# Patient Record
Sex: FEMALE | Race: WHITE | Employment: UNEMPLOYED | URBAN - METROPOLITAN AREA
[De-identification: names, ages, dates, MRNs, and addresses within clinical notes are randomized per-mention and may not be internally consistent; named-entity substitution may affect disease eponyms.]

---

## 2017-07-14 ENCOUNTER — ANESTHESIA (OUTPATIENT)
Dept: SURGERY | Facility: CLINIC | Age: 27
End: 2017-07-14
Payer: COMMERCIAL

## 2017-07-14 ENCOUNTER — SURGERY (OUTPATIENT)
Age: 27
End: 2017-07-14

## 2017-07-14 ENCOUNTER — HOSPITAL ENCOUNTER (OUTPATIENT)
Facility: CLINIC | Age: 27
Discharge: HOME | End: 2017-07-14
Attending: OBSTETRICS & GYNECOLOGY | Admitting: OBSTETRICS & GYNECOLOGY
Payer: COMMERCIAL

## 2017-07-14 ENCOUNTER — ANESTHESIA EVENT (OUTPATIENT)
Dept: SURGERY | Facility: CLINIC | Age: 27
End: 2017-07-14
Payer: COMMERCIAL

## 2017-07-14 VITALS
TEMPERATURE: 99 F | DIASTOLIC BLOOD PRESSURE: 63 MMHG | HEIGHT: 67 IN | RESPIRATION RATE: 16 BRPM | BODY MASS INDEX: 24.94 KG/M2 | SYSTOLIC BLOOD PRESSURE: 107 MMHG | WEIGHT: 158.9 LBS | HEART RATE: 87 BPM | OXYGEN SATURATION: 97 %

## 2017-07-14 DIAGNOSIS — G89.18 ACUTE POST-OPERATIVE PAIN: Primary | ICD-10-CM

## 2017-07-14 PROCEDURE — 27210794 ZZH OR GENERAL SUPPLY STERILE: Performed by: OBSTETRICS & GYNECOLOGY

## 2017-07-14 PROCEDURE — 37000008 ZZH ANESTHESIA TECHNICAL FEE, 1ST 30 MIN: Performed by: OBSTETRICS & GYNECOLOGY

## 2017-07-14 PROCEDURE — 25000125 ZZHC RX 250: Performed by: NURSE ANESTHETIST, CERTIFIED REGISTERED

## 2017-07-14 PROCEDURE — 25000128 H RX IP 250 OP 636: Performed by: OBSTETRICS & GYNECOLOGY

## 2017-07-14 PROCEDURE — 88305 TISSUE EXAM BY PATHOLOGIST: CPT | Mod: 26 | Performed by: OBSTETRICS & GYNECOLOGY

## 2017-07-14 PROCEDURE — 25000128 H RX IP 250 OP 636: Performed by: NURSE ANESTHETIST, CERTIFIED REGISTERED

## 2017-07-14 PROCEDURE — 00000159 ZZHCL STATISTIC H-SEND OUTS PREP: Performed by: OBSTETRICS & GYNECOLOGY

## 2017-07-14 PROCEDURE — 71000027 ZZH RECOVERY PHASE 2 EACH 15 MINS: Performed by: OBSTETRICS & GYNECOLOGY

## 2017-07-14 PROCEDURE — 37000009 ZZH ANESTHESIA TECHNICAL FEE, EACH ADDTL 15 MIN: Performed by: OBSTETRICS & GYNECOLOGY

## 2017-07-14 PROCEDURE — 25000125 ZZHC RX 250: Performed by: OBSTETRICS & GYNECOLOGY

## 2017-07-14 PROCEDURE — 36000050 ZZH SURGERY LEVEL 2 1ST 30 MIN: Performed by: OBSTETRICS & GYNECOLOGY

## 2017-07-14 PROCEDURE — 88305 TISSUE EXAM BY PATHOLOGIST: CPT | Performed by: OBSTETRICS & GYNECOLOGY

## 2017-07-14 PROCEDURE — 40000170 ZZH STATISTIC PRE-PROCEDURE ASSESSMENT II: Performed by: OBSTETRICS & GYNECOLOGY

## 2017-07-14 PROCEDURE — 25000128 H RX IP 250 OP 636: Performed by: ANESTHESIOLOGY

## 2017-07-14 PROCEDURE — 71000012 ZZH RECOVERY PHASE 1 LEVEL 1 FIRST HR: Performed by: OBSTETRICS & GYNECOLOGY

## 2017-07-14 RX ORDER — HYDROMORPHONE HYDROCHLORIDE 1 MG/ML
.3-.5 INJECTION, SOLUTION INTRAMUSCULAR; INTRAVENOUS; SUBCUTANEOUS EVERY 10 MIN PRN
Status: DISCONTINUED | OUTPATIENT
Start: 2017-07-14 | End: 2017-07-14 | Stop reason: HOSPADM

## 2017-07-14 RX ORDER — PROPOFOL 10 MG/ML
INJECTION, EMULSION INTRAVENOUS PRN
Status: DISCONTINUED | OUTPATIENT
Start: 2017-07-14 | End: 2017-07-14

## 2017-07-14 RX ORDER — SODIUM CHLORIDE, SODIUM LACTATE, POTASSIUM CHLORIDE, CALCIUM CHLORIDE 600; 310; 30; 20 MG/100ML; MG/100ML; MG/100ML; MG/100ML
INJECTION, SOLUTION INTRAVENOUS CONTINUOUS PRN
Status: DISCONTINUED | OUTPATIENT
Start: 2017-07-14 | End: 2017-07-14

## 2017-07-14 RX ORDER — FENTANYL CITRATE 50 UG/ML
25-50 INJECTION, SOLUTION INTRAMUSCULAR; INTRAVENOUS EVERY 5 MIN PRN
Status: DISCONTINUED | OUTPATIENT
Start: 2017-07-14 | End: 2017-07-14 | Stop reason: HOSPADM

## 2017-07-14 RX ORDER — ONDANSETRON 2 MG/ML
4 INJECTION INTRAMUSCULAR; INTRAVENOUS EVERY 30 MIN PRN
Status: DISCONTINUED | OUTPATIENT
Start: 2017-07-14 | End: 2017-07-14 | Stop reason: HOSPADM

## 2017-07-14 RX ORDER — HYDROCODONE BITARTRATE AND ACETAMINOPHEN 5; 325 MG/1; MG/1
1-2 TABLET ORAL
Status: DISCONTINUED | OUTPATIENT
Start: 2017-07-14 | End: 2017-07-14 | Stop reason: HOSPADM

## 2017-07-14 RX ORDER — ONDANSETRON 4 MG/1
4 TABLET, ORALLY DISINTEGRATING ORAL EVERY 30 MIN PRN
Status: DISCONTINUED | OUTPATIENT
Start: 2017-07-14 | End: 2017-07-14 | Stop reason: HOSPADM

## 2017-07-14 RX ORDER — LIDOCAINE HYDROCHLORIDE 20 MG/ML
INJECTION, SOLUTION INFILTRATION; PERINEURAL PRN
Status: DISCONTINUED | OUTPATIENT
Start: 2017-07-14 | End: 2017-07-14

## 2017-07-14 RX ORDER — ONDANSETRON 2 MG/ML
INJECTION INTRAMUSCULAR; INTRAVENOUS PRN
Status: DISCONTINUED | OUTPATIENT
Start: 2017-07-14 | End: 2017-07-14

## 2017-07-14 RX ORDER — FENTANYL CITRATE 50 UG/ML
50-100 INJECTION, SOLUTION INTRAMUSCULAR; INTRAVENOUS
Status: COMPLETED | OUTPATIENT
Start: 2017-07-14 | End: 2017-07-14

## 2017-07-14 RX ORDER — NALOXONE HYDROCHLORIDE 0.4 MG/ML
.1-.4 INJECTION, SOLUTION INTRAMUSCULAR; INTRAVENOUS; SUBCUTANEOUS
Status: DISCONTINUED | OUTPATIENT
Start: 2017-07-14 | End: 2017-07-14 | Stop reason: HOSPADM

## 2017-07-14 RX ORDER — FENTANYL CITRATE 50 UG/ML
25-50 INJECTION, SOLUTION INTRAMUSCULAR; INTRAVENOUS
Status: DISCONTINUED | OUTPATIENT
Start: 2017-07-14 | End: 2017-07-14 | Stop reason: HOSPADM

## 2017-07-14 RX ORDER — MEPERIDINE HYDROCHLORIDE 25 MG/ML
12.5 INJECTION INTRAMUSCULAR; INTRAVENOUS; SUBCUTANEOUS
Status: DISCONTINUED | OUTPATIENT
Start: 2017-07-14 | End: 2017-07-14 | Stop reason: HOSPADM

## 2017-07-14 RX ORDER — DEXAMETHASONE SODIUM PHOSPHATE 4 MG/ML
INJECTION, SOLUTION INTRA-ARTICULAR; INTRALESIONAL; INTRAMUSCULAR; INTRAVENOUS; SOFT TISSUE PRN
Status: DISCONTINUED | OUTPATIENT
Start: 2017-07-14 | End: 2017-07-14

## 2017-07-14 RX ORDER — PROPOFOL 10 MG/ML
INJECTION, EMULSION INTRAVENOUS CONTINUOUS PRN
Status: DISCONTINUED | OUTPATIENT
Start: 2017-07-14 | End: 2017-07-14

## 2017-07-14 RX ORDER — HYDROCODONE BITARTRATE AND ACETAMINOPHEN 5; 325 MG/1; MG/1
1-2 TABLET ORAL EVERY 4 HOURS PRN
Qty: 10 TABLET | Refills: 0 | Status: ON HOLD | OUTPATIENT
Start: 2017-07-14 | End: 2018-12-14

## 2017-07-14 RX ORDER — KETOROLAC TROMETHAMINE 30 MG/ML
30 INJECTION, SOLUTION INTRAMUSCULAR; INTRAVENOUS ONCE
Status: COMPLETED | OUTPATIENT
Start: 2017-07-14 | End: 2017-07-14

## 2017-07-14 RX ORDER — SODIUM CHLORIDE, SODIUM LACTATE, POTASSIUM CHLORIDE, CALCIUM CHLORIDE 600; 310; 30; 20 MG/100ML; MG/100ML; MG/100ML; MG/100ML
INJECTION, SOLUTION INTRAVENOUS CONTINUOUS
Status: DISCONTINUED | OUTPATIENT
Start: 2017-07-14 | End: 2017-07-14 | Stop reason: HOSPADM

## 2017-07-14 RX ORDER — DOXYCYCLINE 100 MG/10ML
100 INJECTION, POWDER, LYOPHILIZED, FOR SOLUTION INTRAVENOUS
Status: COMPLETED | OUTPATIENT
Start: 2017-07-14 | End: 2017-07-14

## 2017-07-14 RX ADMIN — PROPOFOL 200 MG: 10 INJECTION, EMULSION INTRAVENOUS at 07:31

## 2017-07-14 RX ADMIN — DEXAMETHASONE SODIUM PHOSPHATE 4 MG: 4 INJECTION, SOLUTION INTRA-ARTICULAR; INTRALESIONAL; INTRAMUSCULAR; INTRAVENOUS; SOFT TISSUE at 07:31

## 2017-07-14 RX ADMIN — FENTANYL CITRATE 50 MCG: 50 INJECTION, SOLUTION INTRAMUSCULAR; INTRAVENOUS at 07:31

## 2017-07-14 RX ADMIN — DOXYCYCLINE HYCLATE 100 MG: 100 INJECTION, POWDER, LYOPHILIZED, FOR SOLUTION INTRAVENOUS at 07:35

## 2017-07-14 RX ADMIN — ONDANSETRON 4 MG: 2 INJECTION INTRAMUSCULAR; INTRAVENOUS at 07:31

## 2017-07-14 RX ADMIN — KETOROLAC TROMETHAMINE 30 MG: 30 INJECTION, SOLUTION INTRAMUSCULAR at 07:35

## 2017-07-14 RX ADMIN — MIDAZOLAM HYDROCHLORIDE 2 MG: 1 INJECTION, SOLUTION INTRAMUSCULAR; INTRAVENOUS at 07:31

## 2017-07-14 RX ADMIN — SODIUM CHLORIDE, POTASSIUM CHLORIDE, SODIUM LACTATE AND CALCIUM CHLORIDE: 600; 310; 30; 20 INJECTION, SOLUTION INTRAVENOUS at 07:28

## 2017-07-14 RX ADMIN — LIDOCAINE HYDROCHLORIDE 100 MG: 20 INJECTION, SOLUTION INFILTRATION; PERINEURAL at 07:31

## 2017-07-14 RX ADMIN — FENTANYL CITRATE 50 MCG: 50 INJECTION, SOLUTION INTRAMUSCULAR; INTRAVENOUS at 07:42

## 2017-07-14 RX ADMIN — PROPOFOL 150 MCG/KG/MIN: 10 INJECTION, EMULSION INTRAVENOUS at 07:31

## 2017-07-14 ASSESSMENT — ENCOUNTER SYMPTOMS
SEIZURES: 0
DYSRHYTHMIAS: 0
ORTHOPNEA: 0

## 2017-07-14 ASSESSMENT — LIFESTYLE VARIABLES: TOBACCO_USE: 0

## 2017-07-14 ASSESSMENT — COPD QUESTIONNAIRES: COPD: 0

## 2017-07-14 NOTE — OP NOTE
Gynecology Operative Note     Pre-operative diagnosis:  Missed  at 8w2d    Post-operative diagnosis  Missed  at 8w2d   Procedure:  Suction Dilation and Curettage   Surgeon:  Paris Garber MD    Anesthesia:  General   Estimated blood loss:  5 ml    Drains:  None    Specimens:  Products of conception   Indication:  27 year old  8w2d by LMP with missed  diagnosed by ultrasound demonstrating intrauterine gestation measuring 6w6d by CRL with no fetal cardiac activity.    Findings:  Uterus 8 week sized, retroflexed, cervix closed. Moderate return of POCs.    Complications:  None    Condition:  Stable   Transferred to post-anesthesia recovery        Procedure:   The patient was taken to the operating room, where general anesthesia was initiated without difficulty. She voided in the preoperative area. The patient was placed in dorsal lithotomy position and was prepped and draped in the usual sterile fashion. Time out was performed and the proper procedure and patient were identified.     A graves speculum was placed in the vagina. A single-toothed tenaculum was applied to the anterior lip of the cervix.  A peres dilator was inserted through the cervical os with no resistance and the cervix was progressively dilated to 25 Setswana. A curved rigid size 7 suction cannula was introduced to the fundus and suction curettage was performed in 3 passes with moderate return of tissue.  One pass with the sharp curette was performed and a uniform gritty sensation was noted. The tenaculum was removed and the cervix was noted to be hemostatic. The speculum was removed.     Sponge, lap, and needle counts were correct x 2. Pt is Rh positive and rhogam was not given.  Doxycycline was given IV preoperatively to be followed by one dose PO the evening after surgery.      Paris Garber MD  OB/Gyn and Infertility PA

## 2017-07-14 NOTE — ANESTHESIA CARE TRANSFER NOTE
Patient: Sheila Calloway    Procedure(s):  SUCTION DILATION AND CURETTAGE  - Wound Class: II-Clean Contaminated    Diagnosis: MISSED AB   Diagnosis Additional Information: No value filed.    Anesthesia Type:   General, LMA     Note:  Airway :Face Mask  Patient transferred to:PACU        Vitals: (Last set prior to Anesthesia Care Transfer)    CRNA VITALS  7/14/2017 0723 - 7/14/2017 0758      7/14/2017             Pulse: 65    SpO2: 99 %    Resp Rate (observed): (!)  4    Resp Rate (set): 10                Electronically Signed By: SANDRA Pena CRNA  July 14, 2017  7:58 AM

## 2017-07-14 NOTE — ANESTHESIA POSTPROCEDURE EVALUATION
Patient: Sheila Calloway    Procedure(s):  SUCTION DILATION AND CURETTAGE  - Wound Class: II-Clean Contaminated    Diagnosis:MISSED AB   Diagnosis Additional Information: No value filed.    Anesthesia Type:  General, LMA    Note:  Anesthesia Post Evaluation    Patient location during evaluation: PACU  Patient participation: Able to fully participate in evaluation  Level of consciousness: awake  Pain management: adequate  Airway patency: patent  Cardiovascular status: acceptable  Respiratory status: acceptable  Hydration status: acceptable  PONV: none     Anesthetic complications: None          Last vitals:  Vitals:    07/14/17 0855 07/14/17 0910 07/14/17 0925   BP: 105/53 104/58 107/63   Pulse:      Resp: 13 16 16   Temp: 37.2  C (99  F)     SpO2: 98% 98% 97%         Electronically Signed By: Marcelo Avitia MD  July 14, 2017  12:12 PM

## 2017-07-14 NOTE — IP AVS SNAPSHOT
MRN:[id number]                      After Visit Summary   7/14/2017    Sheila Calloway    MRN: [id number]           Thank you!     Thank you for choosing [facility] for your care. Our goal is always to provide you with excellent care. Hearing back from our patients is one way we can continue to improve our services. Please take a few minutes to complete the written survey that you may receive in the mail after you visit with us. Thank you!        Patient Information     Date Of Birth          1990        About your hospital stay     You were admitted on:  July 14, 2017 [facility]    You were discharged on:  July 14, 2017       Who to Call     For medical emergencies, please call 911.  For non-urgent questions about your medical care, please call your primary care provider or clinic, None  For questions related to your surgery, please call your surgery clinic        Attending Provider     Provider Specialty    Paris Garber MD OB/Gyn       Primary Care Provider    Physician No Ref-Primary      After Care Instructions     Discharge Instructions       Resume pre procedure diet            Discharge Instructions       Pelvic Rest. No tampons, douching or intercourse for  2  weeks.            Discharge Instructions       May take ibuprofen 600 mg every 6 hours for pain            Discharge Instructions       Patient to arrange follow up appointment in 2  weeks            Discharge Instructions       Please take doxycycline x 1 dose the evening after surgery            Ice to affected area       PRN as tolerated            No alcohol       NO ALCOHOL for 24 hours post procedure            No driving or operating machinery       No driving or operating machinery until day after procedure                  Further instructions from your care team       Same Day Surgery Discharge Instructions for  Sedation and General Anesthesia       It's  not unusual to feel dizzy, light-headed or faint for up to 24 hours after surgery or while taking pain medication.  If you have these symptoms: sit for a few minutes before standing and have someone assist you when you get up to walk or use the bathroom.      You should rest and relax for the next 24 hours. We recommend you make arrangements to have an adult stay with you for at least 24 hours after your discharge.  Avoid hazardous and strenuous activity.      DO NOT DRIVE any vehicle or operate mechanical equipment for 24 hours following the end of your surgery.  Even though you may feel normal, your reactions may be affected by the medication you have received.      Do not drink alcoholic beverages for 24 hours following surgery.       Slowly progress to your regular diet as you feel able. It's not unusual to feel nauseated and/or vomit after receiving anesthesia.  If you develop these symptoms, drink clear liquids (apple juice, ginger ale, broth, 7-up, etc. ) until you feel better.  If your nausea and vomiting persists for 24 hours, please notify your surgeon.        All narcotic pain medications, along with inactivity and anesthesia, can cause constipation. Drinking plenty of liquids and increasing fiber intake will help.      For any questions of a medical nature, call your surgeon.      Do not make important decisions for 24 hours.      If you had general anesthesia, you may have a sore throat for a couple of days related to the breathing tube used during surgery.  You may use Cepacol lozenges to help with this discomfort.  If it worsens or if you develop a fever, contact your surgeon.       If you feel your pain is not well managed with the pain medications prescribed by your surgeon, please contact your surgeon's office to let them know so they can address your concerns.       [facility]  D&C   Discharge Instructions    ACTIVITY:  You may restart normal activities as your abdominal  discomfort disappears.   It is certainly permissible to climb stairs, shower, and do ordinary, quiet activities.  More vigorous activities such as sports, intercourse and work may be resumed in 2 weeks.    OFFICE VISIT:  Please call a day or two after your surgery to make an appointment in approximately 2 weeks to discuss the results of your surgery and have your check-up.    VAGINAL DISCHARGE:  You may have some bloody vaginal discharge for as long as one week.  Ordinary tampons may be used after 2 weeks.  Avoid super absorbent tampons.    TEMPERATURE:  If you develop a temperature elevation of 101  or higher, please call our office immediately.    RESTRICTIONS:  Due to the effects of general anesthesia, please do not drive a car, drink alcoholic beverages, nor operate complex machinery in the first 24 hours following surgery.    PLEASE FEEL FREE TO CALL OUR OFFICE IF ANY QUESTIONS OR PROBLEMS ARISE.    [facility]    Gilmer Lord M.D.  Armando Thompson M.D.   Carlos Alberto Barnes M.D.  LEXIE Mauricio M.D.   Cinthya Waters M.D.  Susannah Rendon M.D.  LIZANDRO Rivas M.D. LEXIE Gomez M.D.  _________________________________________________________________________________                   [facility]              [address] [facility] [address]  [address]            [facility] [address]  [place] [address]            [phone] (Telephone)                                               [phone] (Telephone)            [phone] (Fax)  [phone] (Fax)            [facility]      **If you have questions or concerns about your procedure,  call Dr. Garber at [phone]**      While you were at the hospital today you received Toradol, an  "antiinflammatory medication similar to Ibuprofen.  You should not take other antiinflammatory medication, such as Ibuprofen, Motrin, Advil, Aleve, Naprosyn, etc, until 1:35pm.       ____________________________________________    Our hearts go out to you at this difficult time. Be assured that there is no right way to find comfort and support. It may take time to find out what works for you.  Please do not hesitate to ask for support from our nurses, social workers, or chaplains.  After you leave the hospital, if you need help finding support resources, please call [phone].  [facility] hosts a support group for anyone who has had a pregnancy loss providing a supportive place to learn and share. Couples are encouraged to attend together.     Where: [facility], [facility], [facility]  When: 1st and 3rd Thursday of each month, 5:00pm - 6:30pm  To register, contact:    [place] *[phone] *[email or web address]   [place] *[phone] *[email or web address]    ______________________________________________      Pending Results     Date and Time Order Name Status Description    7/14/2017 0748 Surgical pathology exam In process             Statement of Approval     Ordered          07/14/17 0752  I have reviewed and agree with all the recommendations and orders detailed in this document.  EFFECTIVE NOW     Approved and electronically signed by:  Paris Garber MD             Admission Information     Date & Time Provider Department Dept. Phone    7/14/2017 Paris Garber MD [facility] Same Day Surgery [phone]      Your Vitals Were     Blood Pressure Pulse Temperature Respirations Height Weight    98/49 87 98.6  F (37  C) 16 1.702 m (5' 7\") 72.1 kg (158 lb 14.4 oz)    Pulse Oximetry BMI (Body Mass Index)                98% 24.89 kg/m2          Vimty Information     Vimty lets you send messages to your doctor, view your test results, renew your " "prescriptions, schedule appointments and more. To sign up, go to [email or web address] . Click on \"Log in\" on the left side of the screen, which will take you to the Welcome page. Then click on \"Sign up Now\" on the right side of the page.     You will be asked to enter the access code listed below, as well as some personal information. Please follow the directions to create your username and password.     Your access code is: [email or web address]  Expires: 10/12/2017  8:06 AM     Your access code will  in 90 days. If you need help or a new code, please call your [facility] clinic or [phone].        Care EveryWhere ID     This is your Care EveryWhere ID. This could be used by other organizations to access your [facility] medical records  [id number]        Equal Access to Services     PARTH CORTEZ : Sarah Zelaya, xochitl france, kareen cm, ascencion woodard . So [facility] [phone].    ATENCIÓN: Si habla español, tiene a chen disposición servicios gratuitos de asistencia lingüística. Mat al [phone].    We comply with applicable federal civil rights laws and [place] laws. We do not discriminate on the basis of race, color, national origin, age, disability sex, sexual orientation or gender identity.               Review of your medicines      UNREVIEWED medicines. Ask your doctor about these medicines        Dose / Directions    PNV PO        Dose:  1 tablet   Take 1 tablet by mouth daily   Refills:  0         START taking        Dose / Directions    HYDROcodone-acetaminophen 5-325 MG per tablet   Commonly known as:  NORCO   Used for:  Acute post-operative pain        Dose:  1-2 tablet   Take 1-2 tablets by mouth every 4 hours as needed for other (Moderate to Severe Pain)   Quantity:  10 tablet   Refills:  0            Where to get your medicines      Some of these will need a paper prescription and others can be bought over the counter. Ask your nurse if " you have questions.     Bring a paper prescription for each of these medications     HYDROcodone-acetaminophen 5-325 MG per tablet                Protect others around you: Learn how to safely use, store and throw away your medicines at www.disposemymeds.org.             Medication List: This is a list of all your medications and when to take them. Check marks below indicate your daily home schedule. Keep this list as a reference.      Medications           Morning Afternoon Evening Bedtime As Needed    HYDROcodone-acetaminophen 5-325 MG per tablet   Commonly known as:  NORCO   Take 1-2 tablets by mouth every 4 hours as needed for other (Moderate to Severe Pain)                                PNV PO   Take 1 tablet by mouth daily

## 2017-07-14 NOTE — OR NURSING
Patient states she has Doxycycline PO at home.  0905:  PNDS met, po per I&O sheet. Pt dressed, up in recliner and transported to Phase 2.

## 2017-07-14 NOTE — ANESTHESIA PREPROCEDURE EVALUATION
Procedure: Procedure(s):  DILATION AND CURETTAGE SUCTION  Preop diagnosis: MISSED AB     No Known Allergies  No past medical history on file.  History reviewed. No pertinent surgical history.  Prior to Admission medications    Not on File     Current Facility-Administered Medications Ordered in Epic   Medication Dose Route Frequency Last Rate Last Dose     doxycycline (VIBRAMYCIN) 100 mg vial to attach to  mL bag  100 mg Intravenous Pre-Op/Pre-procedure x 1 dose         No Pre Procedure Antibiotic Needed  1 each As instructed Continuous         ketorolac (TORADOL) injection 30 mg  30 mg Intravenous Once         No current [facility]-ordered outpatient prescriptions on file.     Wt Readings from Last 1 Encounters:   07/14/17 72.1 kg (158 lb 14.4 oz)     Temp Readings from Last 1 Encounters:   07/14/17 36.3  C (97.3  F) (Oral)     BP Readings from Last 6 Encounters:   07/14/17 114/73     Pulse Readings from Last 4 Encounters:   07/14/17 87     Resp Readings from Last 1 Encounters:   07/14/17 18     SpO2 Readings from Last 1 Encounters:   07/14/17 97%     RECENT LABS: reviewed     Anesthesia Evaluation     . Pt has not had prior anesthetic     No history of anesthetic complications          ROS/MED HX    ENT/Pulmonary:      (-) tobacco use, asthma, COPD, sleep apnea and recent URI   Neurologic:      (-) seizures and CVA   Cardiovascular:        (-) hypertension, orthopnea/PND, syncope, arrhythmias, irregular heartbeat/palpitations and valvular problems/murmurs   METS/Exercise Tolerance:  >4 METS   Hematologic:         Musculoskeletal:         GI/Hepatic:        (-) GERD and liver disease   Renal/Genitourinary:      (-) renal disease   Endo:      (-) Type II DM, thyroid disease and chronic steroid usage   Psychiatric:         Infectious Disease:         Malignancy:         Other: Comment: Missed AB                    Physical Exam  Normal systems: dental    Airway   Mallampati: I  TM distance: >3 FB  Neck ROM:  full    Dental     Cardiovascular   Rhythm and rate: regular and normal  (-) no murmur    Pulmonary    breath sounds clear to auscultation(-) no wheezes                    Anesthesia Plan      History & Physical Review  History and physical reviewed and following examination; no interval change.    ASA Status:  1 .    NPO Status:  > 8 hours    Plan for General and LMA with Intravenous and Propofol induction. Maintenance will be Balanced.    PONV prophylaxis:  Ondansetron (or other 5HT-3), Dexamethasone or Solumedrol and Other (See comment)       Postoperative Care  Postoperative pain management:  Multi-modal analgesia.      Consents  Anesthetic plan, risks, benefits and alternatives discussed with:  Patient..

## 2017-07-14 NOTE — DISCHARGE INSTRUCTIONS
Same Day Surgery Discharge Instructions for  Sedation and General Anesthesia       It's not unusual to feel dizzy, light-headed or faint for up to 24 hours after surgery or while taking pain medication.  If you have these symptoms: sit for a few minutes before standing and have someone assist you when you get up to walk or use the bathroom.      You should rest and relax for the next 24 hours. We recommend you make arrangements to have an adult stay with you for at least 24 hours after your discharge.  Avoid hazardous and strenuous activity.      DO NOT DRIVE any vehicle or operate mechanical equipment for 24 hours following the end of your surgery.  Even though you may feel normal, your reactions may be affected by the medication you have received.      Do not drink alcoholic beverages for 24 hours following surgery.       Slowly progress to your regular diet as you feel able. It's not unusual to feel nauseated and/or vomit after receiving anesthesia.  If you develop these symptoms, drink clear liquids (apple juice, ginger ale, broth, 7-up, etc. ) until you feel better.  If your nausea and vomiting persists for 24 hours, please notify your surgeon.        All narcotic pain medications, along with inactivity and anesthesia, can cause constipation. Drinking plenty of liquids and increasing fiber intake will help.      For any questions of a medical nature, call your surgeon.      Do not make important decisions for 24 hours.      If you had general anesthesia, you may have a sore throat for a couple of days related to the breathing tube used during surgery.  You may use Cepacol lozenges to help with this discomfort.  If it worsens or if you develop a fever, contact your surgeon.       If you feel your pain is not well managed with the pain medications prescribed by your surgeon, please contact your surgeon's office to let them know so they can address your concerns.       [facility]  D&C   Discharge  Instructions    ACTIVITY:  You may restart normal activities as your abdominal discomfort disappears.   It is certainly permissible to climb stairs, shower, and do ordinary, quiet activities.  More vigorous activities such as sports, intercourse and work may be resumed in 2 weeks.    OFFICE VISIT:  Please call a day or two after your surgery to make an appointment in approximately 2 weeks to discuss the results of your surgery and have your check-up.    VAGINAL DISCHARGE:  You may have some bloody vaginal discharge for as long as one week.  Ordinary tampons may be used after 2 weeks.  Avoid super absorbent tampons.    TEMPERATURE:  If you develop a temperature elevation of 101  or higher, please call our office immediately.    RESTRICTIONS:  Due to the effects of general anesthesia, please do not drive a car, drink alcoholic beverages, nor operate complex machinery in the first 24 hours following surgery.    PLEASE FEEL FREE TO CALL OUR OFFICE IF ANY QUESTIONS OR PROBLEMS ARISE.    [facility]    Gilmer Lord M.D.  Armando Thompson M.D.   Carlos Alberto Barnse M.D.  LEXIE Mauricio M.D.   Cinthya Waters M.D.  Susannah Rendon M.D.  Shanika Mera D.O.  Paris Gabrer M.D. LEXIE Gomez M.D.  _________________________________________________________________________________                   [facility]              [address] [facility] [address]  [address]            [facility] [address]  [place] [address]            [phone] (Telephone)                                               [phone] (Telephone)            [phone] (Fax)  [phone] (Fax)            [facility]      **If you have questions or concerns about your procedure,  call Dr. Garber at  [phone]**      While you were at the hospital today you received Toradol, an antiinflammatory medication similar to Ibuprofen.  You should not take other antiinflammatory medication, such as Ibuprofen, Motrin, Advil, Aleve, Naprosyn, etc, until 1:35pm.       ____________________________________________    Our hearts go out to you at this difficult time. Be assured that there is no right way to find comfort and support. It may take time to find out what works for you.  Please do not hesitate to ask for support from our nurses, social workers, or chaplains.  After you leave the hospital, if you need help finding support resources, please call [phone].  [facility] hosts a support group for anyone who has had a pregnancy loss providing a supportive place to learn and share. Couples are encouraged to attend together.     Where: [facility], [facility], [facility]  When: 1st and 3rd Thursday of each month, 5:00pm - 6:30pm  To register, contact:    [place] *[phone] *[email or web address]   [place] *[phone] *[email or web address]    ______________________________________________

## 2017-07-14 NOTE — OR NURSING
Pt and  instructed to call Dr office and repot to hospital ER if bladder becomes painful and distended and unable to urinate. Pt blood type o+ per H&P

## 2017-07-14 NOTE — IP AVS SNAPSHOT
[facility] [place] [facility]    [address]    [place] [address]    Phone:  [phone]    Fax:  [phone]                                       After Visit Summary   7/14/2017    Sheila Calloway    MRN: [id number]           After Visit Summary Signature Page     I have received my discharge instructions, and my questions have been answered. I have discussed any challenges I see with this plan with the nurse or doctor.    ..........................................................................................................................................  Patient/Patient Representative Signature      ..........................................................................................................................................  Patient Representative Print Name and Relationship to Patient    ..................................................               ................................................  Date                                            Time    ..........................................................................................................................................  Reviewed by Signature/Title    ...................................................              ..............................................  Date                                                            Time

## 2017-07-17 LAB — COPATH REPORT: NORMAL

## 2018-03-26 LAB
ABO + RH BLD: NORMAL
ABO + RH BLD: NORMAL
BLD GP AB SCN SERPL QL: NEGATIVE
HBV SURFACE AG SERPL QL IA: NEGATIVE
HIV 1+2 AB+HIV1 P24 AG SERPL QL IA: NON REACTIVE
RUBELLA ABY IGG: 3.95
RUBELLA ANTIBODY IGG QUANTITATIVE: NORMAL IU/ML
TREPONEMA ANTIBODIES: NON REACTIVE

## 2018-04-18 LAB
C TRACH DNA SPEC QL PROBE+SIG AMP: NEGATIVE
N GONORRHOEA DNA SPEC QL PROBE+SIG AMP: NEGATIVE

## 2018-11-09 LAB — GROUP B STREP PCR: NEGATIVE

## 2018-12-04 ENCOUNTER — HEALTH MAINTENANCE LETTER (OUTPATIENT)
Age: 28
End: 2018-12-04

## 2018-12-14 ENCOUNTER — HOSPITAL ENCOUNTER (INPATIENT)
Facility: CLINIC | Age: 28
LOS: 3 days | Discharge: HOME | End: 2018-12-17
Attending: OBSTETRICS & GYNECOLOGY | Admitting: OBSTETRICS & GYNECOLOGY
Payer: COMMERCIAL

## 2018-12-14 DIAGNOSIS — Z87.59 STATUS POST VACUUM-ASSISTED VAGINAL DELIVERY: Primary | ICD-10-CM

## 2018-12-14 PROBLEM — Z34.00 PREGNANCY, SUPERVISION, NORMAL, FIRST: Status: ACTIVE | Noted: 2018-12-14

## 2018-12-14 LAB
ABO + RH BLD: NORMAL
ALT SERPL W P-5'-P-CCNC: 16 U/L (ref 0–50)
AST SERPL W P-5'-P-CCNC: 14 U/L (ref 0–45)
BLD GP AB SCN SERPL QL: NORMAL
BLOOD BANK CMNT PATIENT-IMP: NORMAL
BLOOD BANK CMNT PATIENT-IMP: NORMAL
BUN SERPL-MCNC: 10 MG/DL (ref 7–30)
CREAT SERPL-MCNC: 0.69 MG/DL (ref 0.52–1.04)
ERYTHROCYTE [DISTWIDTH] IN BLOOD BY AUTOMATED COUNT: 12.9 % (ref 10–15)
GFR SERPL CREATININE-BSD FRML MDRD: >90 ML/MIN/1.7M2
HCT VFR BLD AUTO: 35.4 % (ref 35–47)
HGB BLD-MCNC: 12.2 G/DL (ref 11.7–15.7)
MCH RBC QN AUTO: 30 PG (ref 26.5–33)
MCHC RBC AUTO-ENTMCNC: 34.5 G/DL (ref 31.5–36.5)
MCV RBC AUTO: 87 FL (ref 78–100)
PLATELET # BLD AUTO: 175 10E9/L (ref 150–450)
RBC # BLD AUTO: 4.07 10E12/L (ref 3.8–5.2)
SPECIMEN EXP DATE BLD: NORMAL
SPECIMEN EXP DATE BLD: NORMAL
WBC # BLD AUTO: 9.9 10E9/L (ref 4–11)

## 2018-12-14 PROCEDURE — 85027 COMPLETE CBC AUTOMATED: CPT | Performed by: OBSTETRICS & GYNECOLOGY

## 2018-12-14 PROCEDURE — 84520 ASSAY OF UREA NITROGEN: CPT | Performed by: OBSTETRICS & GYNECOLOGY

## 2018-12-14 PROCEDURE — 86780 TREPONEMA PALLIDUM: CPT | Performed by: OBSTETRICS & GYNECOLOGY

## 2018-12-14 PROCEDURE — 12000031 ZZH R&B OB CRITICAL

## 2018-12-14 PROCEDURE — 82565 ASSAY OF CREATININE: CPT | Performed by: OBSTETRICS & GYNECOLOGY

## 2018-12-14 PROCEDURE — 86900 BLOOD TYPING SEROLOGIC ABO: CPT | Performed by: OBSTETRICS & GYNECOLOGY

## 2018-12-14 PROCEDURE — 84450 TRANSFERASE (AST) (SGOT): CPT | Performed by: OBSTETRICS & GYNECOLOGY

## 2018-12-14 PROCEDURE — 36415 COLL VENOUS BLD VENIPUNCTURE: CPT | Performed by: OBSTETRICS & GYNECOLOGY

## 2018-12-14 PROCEDURE — 86901 BLOOD TYPING SEROLOGIC RH(D): CPT | Performed by: OBSTETRICS & GYNECOLOGY

## 2018-12-14 PROCEDURE — 84460 ALANINE AMINO (ALT) (SGPT): CPT | Performed by: OBSTETRICS & GYNECOLOGY

## 2018-12-14 PROCEDURE — 86850 RBC ANTIBODY SCREEN: CPT | Performed by: OBSTETRICS & GYNECOLOGY

## 2018-12-14 RX ORDER — ACETAMINOPHEN 325 MG/1
650 TABLET ORAL EVERY 4 HOURS PRN
Status: DISCONTINUED | OUTPATIENT
Start: 2018-12-14 | End: 2018-12-15

## 2018-12-14 RX ORDER — NALOXONE HYDROCHLORIDE 0.4 MG/ML
.1-.4 INJECTION, SOLUTION INTRAMUSCULAR; INTRAVENOUS; SUBCUTANEOUS
Status: DISCONTINUED | OUTPATIENT
Start: 2018-12-14 | End: 2018-12-15

## 2018-12-14 RX ORDER — OXYTOCIN 10 [USP'U]/ML
10 INJECTION, SOLUTION INTRAMUSCULAR; INTRAVENOUS
Status: DISCONTINUED | OUTPATIENT
Start: 2018-12-14 | End: 2018-12-15

## 2018-12-14 RX ORDER — CARBOPROST TROMETHAMINE 250 UG/ML
250 INJECTION, SOLUTION INTRAMUSCULAR
Status: DISCONTINUED | OUTPATIENT
Start: 2018-12-14 | End: 2018-12-15

## 2018-12-14 RX ORDER — METHYLERGONOVINE MALEATE 0.2 MG/ML
200 INJECTION INTRAVENOUS
Status: DISCONTINUED | OUTPATIENT
Start: 2018-12-14 | End: 2018-12-15

## 2018-12-14 RX ORDER — HYDROXYZINE HYDROCHLORIDE 50 MG/1
50-100 TABLET, FILM COATED ORAL ONCE
Status: DISCONTINUED | OUTPATIENT
Start: 2018-12-14 | End: 2018-12-15

## 2018-12-14 RX ORDER — OXYTOCIN/0.9 % SODIUM CHLORIDE 30/500 ML
100-340 PLASTIC BAG, INJECTION (ML) INTRAVENOUS CONTINUOUS PRN
Status: COMPLETED | OUTPATIENT
Start: 2018-12-14 | End: 2018-12-15

## 2018-12-14 RX ORDER — TERBUTALINE SULFATE 1 MG/ML
0.25 INJECTION, SOLUTION SUBCUTANEOUS
Status: DISCONTINUED | OUTPATIENT
Start: 2018-12-14 | End: 2018-12-15

## 2018-12-14 RX ORDER — SODIUM CHLORIDE, SODIUM LACTATE, POTASSIUM CHLORIDE, CALCIUM CHLORIDE 600; 310; 30; 20 MG/100ML; MG/100ML; MG/100ML; MG/100ML
INJECTION, SOLUTION INTRAVENOUS CONTINUOUS
Status: DISCONTINUED | OUTPATIENT
Start: 2018-12-14 | End: 2018-12-15

## 2018-12-14 RX ORDER — LIDOCAINE 40 MG/G
CREAM TOPICAL
Status: DISCONTINUED | OUTPATIENT
Start: 2018-12-14 | End: 2018-12-15

## 2018-12-14 RX ORDER — MISOPROSTOL 100 UG/1
25 TABLET ORAL EVERY 4 HOURS PRN
Status: DISCONTINUED | OUTPATIENT
Start: 2018-12-14 | End: 2018-12-15

## 2018-12-14 RX ORDER — ONDANSETRON 2 MG/ML
4 INJECTION INTRAMUSCULAR; INTRAVENOUS EVERY 6 HOURS PRN
Status: DISCONTINUED | OUTPATIENT
Start: 2018-12-14 | End: 2018-12-15

## 2018-12-14 RX ORDER — OXYCODONE AND ACETAMINOPHEN 5; 325 MG/1; MG/1
1 TABLET ORAL
Status: DISCONTINUED | OUTPATIENT
Start: 2018-12-14 | End: 2018-12-15

## 2018-12-14 RX ORDER — FENTANYL CITRATE 50 UG/ML
50-100 INJECTION, SOLUTION INTRAMUSCULAR; INTRAVENOUS
Status: DISCONTINUED | OUTPATIENT
Start: 2018-12-14 | End: 2018-12-15

## 2018-12-14 RX ORDER — IBUPROFEN 400 MG/1
800 TABLET, FILM COATED ORAL
Status: COMPLETED | OUTPATIENT
Start: 2018-12-14 | End: 2018-12-15

## 2018-12-14 SDOH — HEALTH STABILITY: MENTAL HEALTH: HOW OFTEN DO YOU HAVE A DRINK CONTAINING ALCOHOL?: NEVER

## 2018-12-14 ASSESSMENT — MIFFLIN-ST. JEOR: SCORE: 1721.05

## 2018-12-15 ENCOUNTER — ANESTHESIA (OUTPATIENT)
Dept: OBGYN | Facility: CLINIC | Age: 28
End: 2018-12-15
Payer: COMMERCIAL

## 2018-12-15 ENCOUNTER — ANESTHESIA EVENT (OUTPATIENT)
Dept: OBGYN | Facility: CLINIC | Age: 28
End: 2018-12-15
Payer: COMMERCIAL

## 2018-12-15 PROBLEM — Z87.59 STATUS POST VACUUM-ASSISTED VAGINAL DELIVERY: Status: ACTIVE | Noted: 2018-12-15

## 2018-12-15 LAB — T PALLIDUM AB SER QL: NONREACTIVE

## 2018-12-15 PROCEDURE — 25000132 ZZH RX MED GY IP 250 OP 250 PS 637: Performed by: OBSTETRICS & GYNECOLOGY

## 2018-12-15 PROCEDURE — 3E0R3BZ INTRODUCTION OF ANESTHETIC AGENT INTO SPINAL CANAL, PERCUTANEOUS APPROACH: ICD-10-PCS | Performed by: ANESTHESIOLOGY

## 2018-12-15 PROCEDURE — 12000037 ZZH R&B POSTPARTUM INTERMEDIATE

## 2018-12-15 PROCEDURE — 25000125 ZZHC RX 250: Performed by: OBSTETRICS & GYNECOLOGY

## 2018-12-15 PROCEDURE — 0DQR0ZZ REPAIR ANAL SPHINCTER, OPEN APPROACH: ICD-10-PCS | Performed by: OBSTETRICS & GYNECOLOGY

## 2018-12-15 PROCEDURE — 25000128 H RX IP 250 OP 636: Performed by: ANESTHESIOLOGY

## 2018-12-15 PROCEDURE — 25000128 H RX IP 250 OP 636: Performed by: OBSTETRICS & GYNECOLOGY

## 2018-12-15 PROCEDURE — 72200001 ZZH LABOR CARE VAGINAL DELIVERY SINGLE

## 2018-12-15 PROCEDURE — 25000125 ZZHC RX 250: Performed by: ANESTHESIOLOGY

## 2018-12-15 PROCEDURE — 37000011 ZZH ANESTHESIA WARD SERVICE

## 2018-12-15 PROCEDURE — 27110038 ZZH RX 271: Performed by: ANESTHESIOLOGY

## 2018-12-15 PROCEDURE — 00HU33Z INSERTION OF INFUSION DEVICE INTO SPINAL CANAL, PERCUTANEOUS APPROACH: ICD-10-PCS | Performed by: ANESTHESIOLOGY

## 2018-12-15 RX ORDER — CALCIUM CARBONATE 500 MG/1
1000 TABLET, CHEWABLE ORAL
Status: DISCONTINUED | OUTPATIENT
Start: 2018-12-15 | End: 2018-12-15

## 2018-12-15 RX ORDER — FENTANYL CITRATE 50 UG/ML
100 INJECTION, SOLUTION INTRAMUSCULAR; INTRAVENOUS ONCE
Status: DISCONTINUED | OUTPATIENT
Start: 2018-12-15 | End: 2018-12-15

## 2018-12-15 RX ORDER — LANOLIN 100 %
OINTMENT (GRAM) TOPICAL
Status: DISCONTINUED | OUTPATIENT
Start: 2018-12-15 | End: 2018-12-17 | Stop reason: HOSPADM

## 2018-12-15 RX ORDER — HYDROCORTISONE 2.5 %
CREAM (GRAM) TOPICAL 3 TIMES DAILY PRN
Status: DISCONTINUED | OUTPATIENT
Start: 2018-12-15 | End: 2018-12-17 | Stop reason: HOSPADM

## 2018-12-15 RX ORDER — POLYETHYLENE GLYCOL 3350 17 G/17G
17 POWDER, FOR SOLUTION ORAL DAILY
Status: DISCONTINUED | OUTPATIENT
Start: 2018-12-15 | End: 2018-12-17 | Stop reason: HOSPADM

## 2018-12-15 RX ORDER — ONDANSETRON 2 MG/ML
4 INJECTION INTRAMUSCULAR; INTRAVENOUS EVERY 6 HOURS PRN
Status: DISCONTINUED | OUTPATIENT
Start: 2018-12-15 | End: 2018-12-15

## 2018-12-15 RX ORDER — LIDOCAINE HYDROCHLORIDE AND EPINEPHRINE 15; 5 MG/ML; UG/ML
INJECTION, SOLUTION EPIDURAL PRN
Status: DISCONTINUED | OUTPATIENT
Start: 2018-12-15 | End: 2018-12-16 | Stop reason: HOSPADM

## 2018-12-15 RX ORDER — ROPIVACAINE HYDROCHLORIDE 2 MG/ML
INJECTION, SOLUTION EPIDURAL; INFILTRATION; PERINEURAL PRN
Status: DISCONTINUED | OUTPATIENT
Start: 2018-12-15 | End: 2018-12-16 | Stop reason: HOSPADM

## 2018-12-15 RX ORDER — NALOXONE HYDROCHLORIDE 0.4 MG/ML
.1-.4 INJECTION, SOLUTION INTRAMUSCULAR; INTRAVENOUS; SUBCUTANEOUS
Status: DISCONTINUED | OUTPATIENT
Start: 2018-12-15 | End: 2018-12-17 | Stop reason: HOSPADM

## 2018-12-15 RX ORDER — OXYTOCIN/0.9 % SODIUM CHLORIDE 30/500 ML
100 PLASTIC BAG, INJECTION (ML) INTRAVENOUS CONTINUOUS
Status: DISCONTINUED | OUTPATIENT
Start: 2018-12-15 | End: 2018-12-17 | Stop reason: HOSPADM

## 2018-12-15 RX ORDER — IBUPROFEN 400 MG/1
800 TABLET, FILM COATED ORAL EVERY 6 HOURS PRN
Status: DISCONTINUED | OUTPATIENT
Start: 2018-12-15 | End: 2018-12-17 | Stop reason: HOSPADM

## 2018-12-15 RX ORDER — EPHEDRINE SULFATE 50 MG/ML
5 INJECTION, SOLUTION INTRAMUSCULAR; INTRAVENOUS; SUBCUTANEOUS
Status: DISCONTINUED | OUTPATIENT
Start: 2018-12-15 | End: 2018-12-15

## 2018-12-15 RX ORDER — BISACODYL 10 MG
10 SUPPOSITORY, RECTAL RECTAL DAILY PRN
Status: DISCONTINUED | OUTPATIENT
Start: 2018-12-17 | End: 2018-12-17 | Stop reason: HOSPADM

## 2018-12-15 RX ORDER — NALBUPHINE HYDROCHLORIDE 10 MG/ML
2.5-5 INJECTION, SOLUTION INTRAMUSCULAR; INTRAVENOUS; SUBCUTANEOUS EVERY 6 HOURS PRN
Status: DISCONTINUED | OUTPATIENT
Start: 2018-12-15 | End: 2018-12-15

## 2018-12-15 RX ORDER — ONDANSETRON 4 MG/1
4 TABLET, ORALLY DISINTEGRATING ORAL EVERY 6 HOURS PRN
Status: DISCONTINUED | OUTPATIENT
Start: 2018-12-15 | End: 2018-12-15

## 2018-12-15 RX ORDER — AMOXICILLIN 250 MG
2 CAPSULE ORAL 2 TIMES DAILY
Status: DISCONTINUED | OUTPATIENT
Start: 2018-12-15 | End: 2018-12-17 | Stop reason: HOSPADM

## 2018-12-15 RX ORDER — FENTANYL CITRATE 50 UG/ML
INJECTION, SOLUTION INTRAMUSCULAR; INTRAVENOUS PRN
Status: DISCONTINUED | OUTPATIENT
Start: 2018-12-15 | End: 2018-12-16 | Stop reason: HOSPADM

## 2018-12-15 RX ORDER — ACETAMINOPHEN 325 MG/1
650 TABLET ORAL EVERY 4 HOURS PRN
Status: DISCONTINUED | OUTPATIENT
Start: 2018-12-15 | End: 2018-12-17 | Stop reason: HOSPADM

## 2018-12-15 RX ORDER — OXYTOCIN/0.9 % SODIUM CHLORIDE 30/500 ML
340 PLASTIC BAG, INJECTION (ML) INTRAVENOUS CONTINUOUS PRN
Status: DISCONTINUED | OUTPATIENT
Start: 2018-12-15 | End: 2018-12-17 | Stop reason: HOSPADM

## 2018-12-15 RX ORDER — ROPIVACAINE HYDROCHLORIDE 2 MG/ML
10 INJECTION, SOLUTION EPIDURAL; INFILTRATION; PERINEURAL ONCE
Status: DISCONTINUED | OUTPATIENT
Start: 2018-12-15 | End: 2018-12-15

## 2018-12-15 RX ORDER — OXYTOCIN 10 [USP'U]/ML
10 INJECTION, SOLUTION INTRAMUSCULAR; INTRAVENOUS
Status: DISCONTINUED | OUTPATIENT
Start: 2018-12-15 | End: 2018-12-17 | Stop reason: HOSPADM

## 2018-12-15 RX ORDER — OXYCODONE HYDROCHLORIDE 5 MG/1
5 TABLET ORAL EVERY 4 HOURS PRN
Status: DISCONTINUED | OUTPATIENT
Start: 2018-12-15 | End: 2018-12-17 | Stop reason: HOSPADM

## 2018-12-15 RX ORDER — NALOXONE HYDROCHLORIDE 0.4 MG/ML
.1-.4 INJECTION, SOLUTION INTRAMUSCULAR; INTRAVENOUS; SUBCUTANEOUS
Status: DISCONTINUED | OUTPATIENT
Start: 2018-12-15 | End: 2018-12-15

## 2018-12-15 RX ORDER — AMOXICILLIN 250 MG
1 CAPSULE ORAL 2 TIMES DAILY
Status: DISCONTINUED | OUTPATIENT
Start: 2018-12-15 | End: 2018-12-17 | Stop reason: HOSPADM

## 2018-12-15 RX ADMIN — ACETAMINOPHEN 650 MG: 325 TABLET, FILM COATED ORAL at 23:39

## 2018-12-15 RX ADMIN — ROPIVACAINE HYDROCHLORIDE 5 ML: 2 INJECTION, SOLUTION EPIDURAL; INFILTRATION at 01:04

## 2018-12-15 RX ADMIN — IBUPROFEN 800 MG: 400 TABLET ORAL at 19:52

## 2018-12-15 RX ADMIN — ACETAMINOPHEN 650 MG: 325 TABLET, FILM COATED ORAL at 12:48

## 2018-12-15 RX ADMIN — LIDOCAINE HYDROCHLORIDE,EPINEPHRINE BITARTRATE 3 ML: 15; .005 INJECTION, SOLUTION EPIDURAL; INFILTRATION; INTRACAUDAL; PERINEURAL at 06:17

## 2018-12-15 RX ADMIN — LIDOCAINE HYDROCHLORIDE 20 ML: 10 INJECTION, SOLUTION INFILTRATION; PERINEURAL at 10:46

## 2018-12-15 RX ADMIN — OXYTOCIN-SODIUM CHLORIDE 0.9% IV SOLN 30 UNIT/500ML 340 ML/HR: 30-0.9/5 SOLUTION at 10:45

## 2018-12-15 RX ADMIN — IBUPROFEN 800 MG: 400 TABLET ORAL at 12:47

## 2018-12-15 RX ADMIN — FENTANYL CITRATE 100 MCG: 50 INJECTION, SOLUTION INTRAMUSCULAR; INTRAVENOUS at 01:01

## 2018-12-15 RX ADMIN — ROPIVACAINE HYDROCHLORIDE 5 ML: 2 INJECTION, SOLUTION EPIDURAL; INFILTRATION at 01:01

## 2018-12-15 RX ADMIN — ACETAMINOPHEN 650 MG: 325 TABLET, FILM COATED ORAL at 19:53

## 2018-12-15 RX ADMIN — CALCIUM CARBONATE (ANTACID) CHEW TAB 500 MG 1000 MG: 500 CHEW TAB at 02:25

## 2018-12-15 RX ADMIN — Medication 12 ML/HR: at 01:05

## 2018-12-15 RX ADMIN — SODIUM CHLORIDE, POTASSIUM CHLORIDE, SODIUM LACTATE AND CALCIUM CHLORIDE: 600; 310; 30; 20 INJECTION, SOLUTION INTRAVENOUS at 01:18

## 2018-12-15 RX ADMIN — LIDOCAINE HYDROCHLORIDE,EPINEPHRINE BITARTRATE 3 ML: 15; .005 INJECTION, SOLUTION EPIDURAL; INFILTRATION; INTRACAUDAL; PERINEURAL at 00:57

## 2018-12-15 RX ADMIN — SENNOSIDES AND DOCUSATE SODIUM 1 TABLET: 8.6; 5 TABLET ORAL at 19:53

## 2018-12-15 RX ADMIN — SODIUM CHLORIDE, POTASSIUM CHLORIDE, SODIUM LACTATE AND CALCIUM CHLORIDE 1000 ML: 600; 310; 30; 20 INJECTION, SOLUTION INTRAVENOUS at 00:34

## 2018-12-15 NOTE — PROGRESS NOTES
2000: pt admitted to unit for cervical ripening  2045: SROM. Clear fluid  0050: MDA bedside, fetanyl and Ropivicaine given to MDA  0100: Epidural placed  0130: SVE 3, 90,-2  0210: MDA bedside to bolus epidural  0400: straight cath for 400 ml  0410: SVE: 6,90, 0  0515: Increase in pain, MDA paged  0535: MDA bedside  0558: pt sitting bedside; shaking w/contractions. BP elevated during this time.MDA remains bedside.  0600: Epidural replaced/restarted, comfortable now.  0715: Report given to Belkis BARRETT.

## 2018-12-15 NOTE — PLAN OF CARE
Vaginal delivery with VE assist per MD. See delivery record. Mom and baby stable at this time. RN at bedside for all of pushing stage to assist patient and monitor FHR.

## 2018-12-15 NOTE — L&D DELIVERY NOTE
DELIVERY SUMMARY:  Date: 12/15/2018     HISTORY:  Sheila Calloway is a 28 year old  at 41w0d gestation. Prenatal course uncomplicated. GBS negative.  She is Rh positive and Rubella immune.      FIRST STAGE:  She presented to labor and delivery with spontaneous labor.  Has been scheduled for an induction the evening of admission.  Amniotic fluid was noted to be clear at the time of spontaneous rupture.  She progressed to complete at 0730.  Epidural analgesia.    SECOND STAGE:   Fetal heart tones were reassuring during the second stage of labor.  There were variable decelerations.  She pushed for two hours and made very gradual descent.  The head was at +3 station and developing caput.  Options discussed of continuing to push versus vacuum delivery but her efforts were becoming less active and she was exhausted.  I discussed risk of vacuum delivery including bruising, laceration, retinal hemorrhages, cephalohematoma and ICH.  Discussed more severe maternal lacerations.  Verbal consent for the vacuum was obtained. Three pulls over three contractions and one popoff occurred.  Head delivered OA over intact perineum.  Single nuchal cord. Shoulders were delivered without difficulty and the remainder of the body followed.  The male infant was placed directly on the maternal abdomen and the infant was bulb suctioned.  Cord clamping was delayed 60 seconds after which the cord was clamped and cut.  Cord blood was obtained.  IV Pitocin was given through running IV.  Apgar 8 at 1 minute and 9 at 5 minutes.  Weight 8 lb 3 oz.  The chignon was examined and no laceration noted.     THIRD STAGE:  Pitocin was administered after delivery of the infant.  The placenta delivered spontaneously with gentle cord traction.  A third degree perineal laceration was repaired with 2-0 vicryl in a four quadrant fashion and then for the perineal portion, 3-0 chromic suture in the usual fashion.  The internal anal sphincter was also  repaired with a figure of X chromic suture. A rectal exam was completed and no suture material was present in the rectum and the anal sphincter was well opposed. The uterus was noted to be firm.  Sponge and needle counts were correct.  Quantitated blood loss was 685 cc.  Mom and baby doing well and stable to transfer to postpartum recovery.    Paris Garber  Obstetrics, Gynecology, and Infertility

## 2018-12-15 NOTE — PLAN OF CARE
Data: Sheila Calloway transferred to [id number] via wheelchair at 1420. Baby transferred via parent's arms.  Action: Receiving unit notified of transfer: Yes. Patient and family notified of room change. Report given to Sonal MCCLAIN RN at 1420. Belongings sent to receiving unit. Accompanied by Registered Nurse. Oriented patient to surroundings. Call light within reach. ID bands double-checked with receiving RN.  Response: Patient tolerated transfer and is stable.

## 2018-12-15 NOTE — PLAN OF CARE
, 40+6, arrived for scheduled for cervical ripening.  Pt oriented to room and unit and admission database obtained. Monitors applied with verbal consent.  Dr. Garber updated of arrival and assessment.

## 2018-12-15 NOTE — ANESTHESIA PROCEDURE NOTES
Peripheral nerve/Neuraxial procedure note : epidural catheter  Pre-Procedure  Performed by Filomena Jamison MD  Location: OB      Pre-Anesthestic Checklist: patient identified, IV checked, site marked, risks and benefits discussed, informed consent, monitors and equipment checked, pre-op evaluation and at physician/surgeon's request    Timeout  Correct Patient: Yes   Correct Procedure: Yes   Correct Site: Yes   Correct Laterality: Yes   Correct Position: Yes   Site Marked: Yes   .   Procedure Documentation    .    Procedure:    Epidural catheter.  Insertion Site:L2-3  (midline approach) Injection technique: LORT saline and LORT air   Local skin infiltrated with 1 mL of 1% lidocaine.       Patient Prep;povidone-iodine 7.5% surgical scrub.  .  Needle: Touhy needle Needle Gauge: 17.    Needle Length (Inches) 3.5  # of attempts: 1 and  # of redirects:  2 .   Catheter: 19 G . .  Catheter threaded easily  .  .   .    Assessment/Narrative  Paresthesias: No.  .  .  Aspiration negative for heme or CSF  . Test dose of 3 mL lidocaine 1.5% w/ 1:200,000 epinephrine at. Test dose negative for signs of intravascular, subdural or intrathecal injection. Comments:  Pre-procedure time out completed. Patient in sitting position, the lumbar spine was prepped and draped in sterile fashion. The L2/L3 interspace was identified and local anesthetic was injected for local skin infiltration. A 17 G touhy needle was advanced to the epidural space which was confirmed with the loss of resistance technique at 5 cm. A catheter was then advanced easily into the epidural space. The catheter was left at 9 cm at the skin. Negative aspiration of blood and CSF was confirmed. A test dose of 1.5% lidocaine with 1:200,000 epinephrine was injected through the catheter and was negative for intravascular injection. The site was covered with sterile tegaderm and the catheter was secured with tape.

## 2018-12-15 NOTE — ANESTHESIA PROCEDURE NOTES
Peripheral nerve/Neuraxial procedure note : epidural catheter  Pre-Procedure  Performed by Filomena Jamison MD  Location: OB      Pre-Anesthestic Checklist: patient identified, IV checked, site marked, risks and benefits discussed, informed consent, monitors and equipment checked, pre-op evaluation and at physician/surgeon's request    Timeout  Correct Patient: Yes   Correct Procedure: Yes   Correct Site: Yes   Correct Laterality: Yes   Correct Position: Yes   Site Marked: Yes   .   Procedure Documentation    .    Procedure:    Epidural catheter.  Insertion Site:L2-3  (midline approach) Injection technique: LORT saline and LORT air   Local skin infiltrated with 1 mL of 1% lidocaine.       Patient Prep;povidone-iodine 7.5% surgical scrub.  .  Needle: Touhy needle Needle Gauge: 17.    Needle Length (Inches) 3.5  # of attempts: 1 and # of redirects:  .   Catheter: 19 G . .  Catheter threaded easily  .  .   .    Assessment/Narrative  Paresthesias: No.  .  .  Aspiration negative for heme or CSF  . Test dose of 3 mL lidocaine 1.5% w/ 1:200,000 epinephrine at. Test dose negative for signs of intravascular, subdural or intrathecal injection. Comments:  Pre-procedure time out completed. Patient in sitting position, the lumbar spine was prepped and draped in sterile fashion. The L2/L3 interspace was identified and local anesthetic was injected for local skin infiltration. A 17 G touhy needle was advanced to the epidural space which was confirmed with the loss of resistance technique at 4.5 cm. A catheter was then advanced easily into the epidural space. The catheter was left at 8.5 cm at the skin. Negative aspiration of blood and CSF was confirmed. A test dose of 1.5% lidocaine with 1:200,000 epinephrine was injected through the catheter and was negative for intravascular injection. The site was covered with sterile tegaderm and the catheter was secured with tape.

## 2018-12-15 NOTE — PLAN OF CARE
Patient  up to bathroom with assist of one, unable to void, pericare instructions given and patient verbalized understanding. Ambulated to wheelchair for transfer.

## 2018-12-15 NOTE — ANESTHESIA PREPROCEDURE EVALUATION
"Anesthesia Pre-Procedure Evaluation    Patient: Sheila Calloway   MRN: [id number] : 1990          Preoperative Diagnosis: * No surgery found *        History reviewed. No pertinent past medical history.  Past Surgical History:   Procedure Laterality Date     DILATION AND CURETTAGE SUCTION N/A 2017    Procedure: DILATION AND CURETTAGE SUCTION;  SUCTION DILATION AND CURETTAGE ;  Surgeon: Paris Garber MD;  Location: [facility]                        Lab Results   Component Value Date    WBC 9.9 2018    HGB 12.2 2018    HCT 35.4 2018     2018    BUN 10 2018    CR 0.69 2018    ALT 16 2018    AST 14 2018       Preop Vitals  BP Readings from Last 3 Encounters:   18 (!) 135/91   17 107/63    Pulse Readings from Last 3 Encounters:   18 87   17 87      Resp Readings from Last 3 Encounters:   18 16   17 16    SpO2 Readings from Last 3 Encounters:   17 97%      Temp Readings from Last 1 Encounters:   18 36.2  C (97.2  F) (Temporal)    Ht Readings from Last 1 Encounters:   18 1.727 m (5' 8\")      Wt Readings from Last 1 Encounters:   18 95.3 kg (210 lb)    Estimated body mass index is 31.93 kg/m  as calculated from the following:    Height as of this encounter: 1.727 m (5' 8\").    Weight as of this encounter: 95.3 kg (210 lb).       Anesthesia Plan      History & Physical Review      ASA Status:  2 .  OB Epidural Asa: 2       Plan for Epidural          Postoperative Care      Consents  Anesthetic plan, risks, benefits and alternatives discussed with:  Patient and Patient..                 Filomena Jamison MD  "

## 2018-12-15 NOTE — H&P
[facility] Labor and Delivery History and Physical    Sheila Calloway MRN# [id number]   Age: 28 year old YOB: 1990     Date of Admission:  2018    Primary care provider: Paris Garber  Primary clinic: Obstetrics, Gynecology, and Infertility           HPI:   Sheila Calloway is a 28 year old  at 41w0d by LMP c/w early US admitted for induction but found to be in spontaneous labor.  SROM shortly after admission.          Pregnancy history:     OBSTETRIC HISTORY:  Obstetric History       T0      L0     SAB0   TAB0   Ectopic0   Multiple0   Live Births0       # Outcome Date GA Lbr Lenny/2nd Weight Sex Delivery Anes PTL Lv   2 Current            1                    EDC: Estimated Date of Delivery: Dec 8, 2018    Complications: none, history of miscarriage  Patient Active Problem List   Diagnosis     Pregnancy, supervision, normal, first       Prenatal Labs:   Lab Results   Component Value Date    ABO O 2018    RH Pos 2018    AS Neg 2018    HEPBANG negative 2018    CHPCRT negative 2018    GCPCRT negative 2018    RUBELLAABIGG 3.95 2018    HGB 12.2 2018       GBS Status:   Lab Results   Component Value Date    GBS negative 2018       Ultrasounds:  Normal anatomy screen        Maternal Past Medical History:   History reviewed. No pertinent past medical history.  Past Surgical History:   Procedure Laterality Date     DILATION AND CURETTAGE SUCTION N/A 2017    Procedure: DILATION AND CURETTAGE SUCTION;  SUCTION DILATION AND CURETTAGE ;  Surgeon: Paris Garber MD;  Location: [facility]      Medications Prior to Admission   Medication Sig Dispense Refill Last Dose     Prenatal Vit w/Dg-Zhxukgsom-JP (PNV PO) Take 1 tablet by mouth daily   2018 at Unknown time           Family History:   The family history is not on file.          Social History:     Social History     Tobacco Use     Smoking status:  Never Smoker     Smokeless tobacco: Never Used   Substance Use Topics     Alcohol use: No     Frequency: Never     Comment: 2/week; nothing during pregnancy            Review of Systems:   The Review of Systems is negative other than noted in the HPI          Physical Exam:     Patient Vitals for the past 8 hrs:   BP Temp Temp src SpO2   12/15/18 0719 132/67 -- -- --   12/15/18 0710 166/68 -- -- --   12/15/18 0708 129/72 -- -- --   12/15/18 0703 (!) 156/106 -- -- --   12/15/18 0700 175/82 97.4  F (36.3  C) Temporal 98 %   12/15/18 0650 156/80 -- -- --   12/15/18 0640 168/78 -- -- --   12/15/18 0630 160/77 -- -- --   12/15/18 0620 153/71 -- -- --   12/15/18 0610 145/90 -- -- --   12/15/18 0600 165/86 -- -- 96 %   12/15/18 0550 148/84 -- -- --   12/15/18 0540 (!) 151/92 -- -- --   12/15/18 0536 (!) 149/91 -- -- 97 %   12/15/18 0531 -- -- -- 97 %   12/15/18 0530 148/89 98.4  F (36.9  C) Temporal 97 %   12/15/18 0510 140/79 -- -- --   12/15/18 0440 140/81 -- -- --   12/15/18 0400 137/86 97.8  F (36.6  C) Temporal 99 %   12/15/18 0340 130/65 -- -- --   12/15/18 0326 -- -- -- 99 %   12/15/18 0321 -- -- -- 100 %   12/15/18 0316 -- -- -- 100 %   12/15/18 0311 -- -- -- 100 %   12/15/18 0310 -- 98  F (36.7  C) Temporal --   12/15/18 0306 -- -- -- 99 %   12/15/18 0301 126/67 -- -- 100 %   12/15/18 0300 126/67 -- -- 100 %   12/15/18 0256 128/67 -- -- 99 %   12/15/18 0251 127/70 -- -- 98 %   12/15/18 0246 125/73 -- -- 98 %   12/15/18 0241 124/71 -- -- 99 %   12/15/18 0238 125/68 -- -- --   12/15/18 0236 -- -- -- 98 %   12/15/18 0232 129/68 -- -- --   12/15/18 0231 -- -- -- 98 %   12/15/18 0226 -- -- -- 99 %   12/15/18 0225 127/70 -- -- --   12/15/18 0223 130/66 -- -- --   12/15/18 0221 126/67 -- -- 99 %   12/15/18 0219 126/67 -- -- --   12/15/18 0217 127/68 -- -- --   12/15/18 0216 -- -- -- 98 %   12/15/18 0215 126/69 -- -- --   12/15/18 0213 121/69 -- -- --   12/15/18 0211 121/64 -- -- 98 %   12/15/18 0209 130/65 -- -- --    12/15/18 0206 -- -- -- 99 %   12/15/18 0205 122/64 -- -- --   12/15/18 0200 123/68 97.8  F (36.6  C) Temporal 98 %   12/15/18 0156 -- -- -- 97 %   12/15/18 0154 120/71 -- -- --   12/15/18 0151 -- -- -- 98 %   12/15/18 0149 119/68 -- -- --   12/15/18 0146 -- -- -- 98 %   12/15/18 0144 122/64 -- -- --   12/15/18 0141 -- -- -- 97 %   12/15/18 0139 127/69 -- -- --   12/15/18 0136 -- -- -- 97 %   12/15/18 0135 128/64 -- -- --   12/15/18 0130 156/86 -- -- 98 %   12/15/18 0126 -- -- -- 98 %   12/15/18 0123 142/75 97.8  F (36.6  C) Temporal --   12/15/18 0121 143/76 -- -- 98 %   12/15/18 0119 143/66 -- -- --   12/15/18 0117 148/71 -- -- --   12/15/18 0116 -- -- -- 97 %   12/15/18 0115 150/71 -- -- --   12/15/18 0113 146/68 -- -- --   12/15/18 0111 149/67 -- -- 97 %   12/15/18 0109 151/73 -- -- --   12/15/18 0107 151/70 -- -- --   12/15/18 0105 153/67 -- -- --   12/15/18 0103 158/76 -- -- --   12/15/18 0101 (!) 173/92 -- -- 100 %   12/15/18 0051 -- -- -- 99 %     Gen: Comfortable with epidural  CV: WWP  Resp: Nonlabored breathing  Abd: Gravid c/w gestational age  Ext: 1+ edema    Cervix: Complete/+2  Membranes: SROM clear  EFW: 8#  Presentation:Cephalic    NST  Fetal Heart Rate Tracing:   Baseline 150  Variability: moderate  Accelerations: Present  Decelerations: None  Interpretation: reactive    Contractions: q 2-3 min per toco        Assessment:   Sheila Calloway is a 28 year old  at 41w0d admitted for postdates induction but was in spontaneous labor with SROM..        Plan:   1. Expectant management  2. Fetal wellbeing: Category I FHT    Paris Garber   Obstetrics, Gynecology, and Infertility

## 2018-12-16 LAB — HGB BLD-MCNC: 10.1 G/DL (ref 11.7–15.7)

## 2018-12-16 PROCEDURE — 85018 HEMOGLOBIN: CPT | Performed by: OBSTETRICS & GYNECOLOGY

## 2018-12-16 PROCEDURE — 25000132 ZZH RX MED GY IP 250 OP 250 PS 637: Performed by: OBSTETRICS & GYNECOLOGY

## 2018-12-16 PROCEDURE — 36415 COLL VENOUS BLD VENIPUNCTURE: CPT | Performed by: OBSTETRICS & GYNECOLOGY

## 2018-12-16 PROCEDURE — 12000037 ZZH R&B POSTPARTUM INTERMEDIATE

## 2018-12-16 RX ORDER — IBUPROFEN 800 MG/1
800 TABLET, FILM COATED ORAL EVERY 8 HOURS PRN
Qty: 60 TABLET | Refills: 1 | Status: SHIPPED | OUTPATIENT
Start: 2018-12-16 | End: 2019-01-15

## 2018-12-16 RX ORDER — AMOXICILLIN 250 MG
1-2 CAPSULE ORAL 2 TIMES DAILY PRN
Qty: 60 TABLET | Refills: 1 | Status: SHIPPED | OUTPATIENT
Start: 2018-12-16 | End: 2019-01-15

## 2018-12-16 RX ADMIN — IBUPROFEN 800 MG: 400 TABLET ORAL at 14:04

## 2018-12-16 RX ADMIN — IBUPROFEN 800 MG: 400 TABLET ORAL at 20:40

## 2018-12-16 RX ADMIN — ACETAMINOPHEN 650 MG: 325 TABLET, FILM COATED ORAL at 20:40

## 2018-12-16 RX ADMIN — ACETAMINOPHEN 650 MG: 325 TABLET, FILM COATED ORAL at 08:05

## 2018-12-16 RX ADMIN — POLYETHYLENE GLYCOL 3350 17 G: 17 POWDER, FOR SOLUTION ORAL at 10:41

## 2018-12-16 RX ADMIN — SENNOSIDES AND DOCUSATE SODIUM 1 TABLET: 8.6; 5 TABLET ORAL at 20:40

## 2018-12-16 RX ADMIN — SENNOSIDES AND DOCUSATE SODIUM 1 TABLET: 8.6; 5 TABLET ORAL at 08:05

## 2018-12-16 RX ADMIN — IBUPROFEN 800 MG: 400 TABLET ORAL at 08:05

## 2018-12-16 RX ADMIN — IBUPROFEN 800 MG: 400 TABLET ORAL at 02:04

## 2018-12-16 RX ADMIN — ACETAMINOPHEN 650 MG: 325 TABLET, FILM COATED ORAL at 03:51

## 2018-12-16 RX ADMIN — ACETAMINOPHEN 650 MG: 325 TABLET, FILM COATED ORAL at 14:04

## 2018-12-16 NOTE — LACTATION NOTE
Initial Lactation visit. Hand out given. Recommend unlimited, frequent breast feedings: At least 8 - 12 times every 24 hours. Avoid pacifiers and supplementation with formula unless medically indicated. Explained benefits of holding baby skin on skin to help promote better breastfeeding outcomes.     Infant latched with shield at time of visit but sleepy and not sucking. Sheila states her baby boy is tongue tied and her nipples are getting sore. She's using lanolin cream. Warm compresses discussed as well. Encouraged Sheila to continue calling staff for latch checks and assist with feedings as needed. Sheila and her  appreciative of my visit. Will revisit as needed.     Susannah Richard RN IBCLC

## 2018-12-16 NOTE — ANESTHESIA POSTPROCEDURE EVALUATION
Patient: Sheila Calloway    * No procedures listed *    Diagnosis:* No pre-op diagnosis entered *  Diagnosis Additional Information: No value filed.    Anesthesia Type:  No value filed.    Note:  Anesthesia Post Evaluation    Patient location during evaluation: bedside  Patient participation: Able to fully participate in evaluation  Level of consciousness: awake  Pain management: adequate  Airway patency: patent  Cardiovascular status: acceptable  Respiratory status: acceptable  Hydration status: acceptable  PONV: none     Anesthetic complications: None    Comments: Patient denies any residual numbness or weakness in bilateral lower extremities, severe back pain, or positional headache. Patient was instructed to inform nurse of any of these symptoms and to contact anesthesia to reevaluate.        Last vitals:  Vitals:    12/16/18 0351 12/16/18 0430 12/16/18 0800   BP:   116/71   Pulse:      Resp: 16 16 18   Temp:   36.9  C (98.4  F)   SpO2:            Electronically Signed By: Zenon Casas MD  December 16, 2018  4:39 PM

## 2018-12-16 NOTE — PLAN OF CARE
Pt stable, VSS, PP checks WDL. Sore nipples; encouraging to call RN/lactation to check latch when feeding.

## 2018-12-16 NOTE — PLAN OF CARE
Pt VS and temperature stable during shift; voiding appropriately, minimal bleeding with no clots; episiotomy intact, swollen; pain controlled well with Ibuprofen and Tylenol; using tuks and ice packs for  pain relief; ambulating ad katelyn independently; fundus firm at U/1; breastfeeding infant well, using shield PRN for flat nipples; parents bonding well with infant; instructed to call with any questions or concerns; will continue to monitor

## 2018-12-16 NOTE — PROGRESS NOTES
"OB PROGRESS NOTE    Postpartum Day 1: Vacuum-Assisted Vaginal Delivery    SUBJECTIVE  Feels well. Pain is well controlled with Tylenol and Ibuprofen.  She has no complaints.  She is urinating, tolerating a general diet and ambulating without difficulty.  Breast feeding is going well.  Lochia is moderate.  She denies emotional concerns.      EXAM  /80   Pulse 87   Temp 98.5  F (36.9  C) (Oral)   Resp 16   Ht 1.727 m (5' 8\")   Wt 95.3 kg (210 lb)   LMP  (Exact Date)   SpO2 99%   Breastfeeding? Unknown   BMI 31.93 kg/m      GENERAL APPEARANCE:  normal affect  ABDOMEN: soft, nontender, fundus firm below the umbilicus    Recent Results (from the past 2016 hour(s))   Group B strep PCR    Collection Time: 11/09/18 12:00 AM   Result Value Ref Range    Group B Strep PCR negative    Treponema Abs w Reflex to RPR and Titer    Collection Time: 12/14/18  9:13 PM   Result Value Ref Range    Treponema Antibodies Nonreactive NR^Nonreactive   ABO/Rh type and screen    Collection Time: 12/14/18  9:13 PM   Result Value Ref Range    ABO O     RH(D) Pos     Antibody Screen Neg     Test Valid Only At [facility]        Specimen Expires 12/17/2018     Blood Bank Comment       This specimen was not labeled according to requirements for establishing a blood type for   transfusion purposes.  The patient can be transfused with type O red cells until a new   specimen is obtained to confirm the patient's blood type, at which time type-specific   blood can be transfused.     ALT    Collection Time: 12/14/18  9:13 PM   Result Value Ref Range    ALT 16 0 - 50 U/L   AST    Collection Time: 12/14/18  9:13 PM   Result Value Ref Range    AST 14 0 - 45 U/L   Urea nitrogen    Collection Time: 12/14/18  9:13 PM   Result Value Ref Range    Urea Nitrogen 10 7 - 30 mg/dL   CBC with platelets    Collection Time: 12/14/18  9:13 PM   Result Value Ref Range    WBC 9.9 4.0 - 11.0 10e9/L    RBC Count 4.07 3.8 - 5.2 10e12/L    " Hemoglobin 12.2 11.7 - 15.7 g/dL    Hematocrit 35.4 35.0 - 47.0 %    MCV 87 78 - 100 fl    MCH 30.0 26.5 - 33.0 pg    MCHC 34.5 31.5 - 36.5 g/dL    RDW 12.9 10.0 - 15.0 %    Platelet Count 175 150 - 450 10e9/L   Creatinine    Collection Time: 18  9:13 PM   Result Value Ref Range    Creatinine 0.69 0.52 - 1.04 mg/dL    GFR Estimate >90 >60 mL/min/1.7m2    GFR Estimate If Black >90 >60 mL/min/1.7m2   ABO and Rh    Collection Time: 18 10:30 PM   Result Value Ref Range    ABO O     RH(D) Pos     Specimen Expires 2018    Hemoglobin    Collection Time: 18  8:11 AM   Result Value Ref Range    Hemoglobin 10.1 (L) 11.7 - 15.7 g/dL   ]    ASSESSMENT  Sheila Calloway is a 28 year old  PPD# 1 s/p  at 41 weeks gestation.  Doing well, uncomplicated course.    PLAN    1) Routine post-partum cares.  Encourage ambulation.  2) Rh+.  Rhogam not indicated  3) Pertussis vaccine to be given if indicated  4) Anticipatory guidance given for home  5) Anticipate discharge tomorrow; discharge instructions and prescriptions written today      Maci Austin MD  Obstetrics, Gynecology and Infertility

## 2018-12-16 NOTE — PLAN OF CARE
Pt VS and temperature stable during shift; voiding appropriately, bleeding moderate with no clots; episiotomy intact, swollen; pain controlled well with Ibuprofen and Tylenol; using tuks and ice packs for extra pain relief; ambulating ad katelyn independently; fundus firm at U/1; breastfeeding infant well, using shield PRN for flat nipples; parents bonding well with infant; instructed to call with any questions or concerns; will continue to monitor

## 2018-12-17 ENCOUNTER — DOCUMENTATION ONLY (OUTPATIENT)
Dept: OBGYN | Facility: CLINIC | Age: 28
End: 2018-12-17

## 2018-12-17 VITALS
OXYGEN SATURATION: 99 % | WEIGHT: 210 LBS | TEMPERATURE: 97.7 F | SYSTOLIC BLOOD PRESSURE: 127 MMHG | DIASTOLIC BLOOD PRESSURE: 84 MMHG | RESPIRATION RATE: 16 BRPM | HEART RATE: 87 BPM | BODY MASS INDEX: 31.83 KG/M2 | HEIGHT: 68 IN

## 2018-12-17 PROCEDURE — 25000132 ZZH RX MED GY IP 250 OP 250 PS 637: Performed by: OBSTETRICS & GYNECOLOGY

## 2018-12-17 RX ADMIN — SENNOSIDES AND DOCUSATE SODIUM 2 TABLET: 8.6; 5 TABLET ORAL at 08:27

## 2018-12-17 RX ADMIN — IBUPROFEN 800 MG: 400 TABLET ORAL at 08:27

## 2018-12-17 RX ADMIN — ACETAMINOPHEN 650 MG: 325 TABLET, FILM COATED ORAL at 02:26

## 2018-12-17 RX ADMIN — ACETAMINOPHEN 650 MG: 325 TABLET, FILM COATED ORAL at 08:26

## 2018-12-17 RX ADMIN — IBUPROFEN 800 MG: 400 TABLET ORAL at 02:26

## 2018-12-17 RX ADMIN — POLYETHYLENE GLYCOL 3350 17 G: 17 POWDER, FOR SOLUTION ORAL at 08:27

## 2018-12-17 NOTE — PROGRESS NOTES
[facility] will be sharing updates with you on Maternal Child Health Referral requests for home care services.  This is for care coordination purposes and alert you to referral status.  We received the referral for  Sheila Calloway; MRN [id number] and want to update you:    [facility] is unable to see patient for postpartum/  assessment and education due to patient [employer]  insurance not contracted with [facility] for this service.  Patients mother advised to contact their insurance provider to determine if service is covered through another homecare agency. Offered option of private pay nurse assessment and education for mom or baby at service rate of 150.00 per visit or 180.00 for both.  Provided call back information if private pay visit is requested      Sincerely [facility]  Marlin Jones  [phone]

## 2018-12-17 NOTE — PROGRESS NOTES
"PPD 2  S.  Ready to go home  O.  AVSS  /71   Pulse 87   Temp 97.9  F (36.6  C) (Oral)   Resp 16   Ht 1.727 m (5' 8\")   Wt 95.3 kg (210 lb)   LMP  (Exact Date)   SpO2 99%   Breastfeeding? Unknown   BMI 31.93 kg/m    Fundus firm, nontender  Recent Labs   Lab 12/16/18  0811 12/14/18  2113   HGB 10.1* 12.2   A.  Doing well.  Anemia due to acute blood loss, tolerating well.  P.  To home, resume PNV with iron./TemitopeMD  "

## 2018-12-17 NOTE — PLAN OF CARE
Pt on pathway.  Fundus firm U/1, scant flow. Using ice and tucks.  Breastfeeding improving, using shield.  Tylenol and ibuprofen for pain management.   and pt updated on plan of care, bonding well with baby.  Will plan for discharge home tomorrow, needs pump.

## 2018-12-17 NOTE — LACTATION NOTE
Follow up visit.  Infant is feeding well with the shield.  Nipples are tender but not breaking down with shield use.  Frenulum is tight, encouraged Sheila to watch closely for any nipple damage or if infant is having difficulty transferring milk and needing it to be released.   Shaan said he cluster fed overnight.  She had no concerns today.  Reviewed outpatient lactation resources at [facility] if needing assistance when discharged.  Leatha Mcduffie RN, IBCLC

## 2018-12-17 NOTE — PLAN OF CARE
VSS Pt using Ibuprofen and tylenol for pain control. Also using tucks to perineum. Breastfeeding baby every 3 hours using nipple shield. Discharging to home with baby later this afternoon.

## 2018-12-17 NOTE — PLAN OF CARE
Patient doing well.  Pain well controlled with tylenol and ibuprofen every 6 hours.  Voiding without difficulty.  Using ice pack and tucks prn to perineum.  Breast feeding going well with use of shield.  Encouraged patient to call with any questions or concerns.

## 2018-12-17 NOTE — PLAN OF CARE
VSS.  Pain well controlled with tylenol and ibuprofen.  Up independently in room.  Breastfeeding well with nipple shield due to tight frenulum. Independent with  cares.  providing support at beside. Questions encouraged. On pathway. Continue to monitor and notify MD as needed. Discharge orders in, plan to discharge .

## 2019-09-27 ENCOUNTER — HOSPITAL PATHOLOGY (OUTPATIENT)
Dept: OTHER | Facility: CLINIC | Age: 29
End: 2019-09-27

## 2019-10-01 LAB — COPATH REPORT: NORMAL

## 2020-05-01 ENCOUNTER — TRANSFERRED RECORDS (OUTPATIENT)
Dept: HEALTH INFORMATION MANAGEMENT | Facility: CLINIC | Age: 30
End: 2020-05-01

## 2020-05-01 LAB
C TRACH DNA SPEC QL PROBE+SIG AMP: NEGATIVE
N GONORRHOEA DNA SPEC QL PROBE+SIG AMP: NEGATIVE
SPECIMEN DESCRIP: NORMAL
SPECIMEN DESCRIPTION: NORMAL
TSH SERPL-ACNC: 3.9 UIU/ML (ref 0.45–4.5)

## 2020-05-15 ENCOUNTER — TRANSFERRED RECORDS (OUTPATIENT)
Dept: HEALTH INFORMATION MANAGEMENT | Facility: CLINIC | Age: 30
End: 2020-05-15

## 2020-05-15 DIAGNOSIS — Z11.59 ENCOUNTER FOR SCREENING FOR OTHER VIRAL DISEASES: Primary | ICD-10-CM

## 2020-05-16 ENCOUNTER — HOSPITAL ENCOUNTER (OUTPATIENT)
Facility: CLINIC | Age: 30
End: 2020-05-16
Payer: COMMERCIAL

## 2020-05-16 DIAGNOSIS — Z11.59 ENCOUNTER FOR SCREENING FOR OTHER VIRAL DISEASES: ICD-10-CM

## 2020-05-16 PROCEDURE — 99207 ZZC NO BILLABLE SERVICE THIS VISIT: CPT

## 2020-05-16 PROCEDURE — 87635 SARS-COV-2 COVID-19 AMP PRB: CPT | Performed by: FAMILY MEDICINE

## 2020-05-16 PROCEDURE — 99000 SPECIMEN HANDLING OFFICE-LAB: CPT | Performed by: FAMILY MEDICINE

## 2020-05-17 LAB
SARS-COV-2 RNA SPEC QL NAA+PROBE: NOT DETECTED
SPECIMEN SOURCE: NORMAL

## 2020-05-19 ENCOUNTER — ANESTHESIA (OUTPATIENT)
Dept: SURGERY | Facility: CLINIC | Age: 30
End: 2020-05-19
Payer: COMMERCIAL

## 2020-05-19 ENCOUNTER — HOSPITAL ENCOUNTER (OUTPATIENT)
Facility: CLINIC | Age: 30
Discharge: HOME | End: 2020-05-19
Attending: OBSTETRICS & GYNECOLOGY | Admitting: OBSTETRICS & GYNECOLOGY
Payer: COMMERCIAL

## 2020-05-19 ENCOUNTER — TRANSFERRED RECORDS (OUTPATIENT)
Dept: HEALTH INFORMATION MANAGEMENT | Facility: CLINIC | Age: 30
End: 2020-05-19

## 2020-05-19 ENCOUNTER — ANESTHESIA EVENT (OUTPATIENT)
Dept: SURGERY | Facility: CLINIC | Age: 30
End: 2020-05-19
Payer: COMMERCIAL

## 2020-05-19 VITALS
BODY MASS INDEX: 25.9 KG/M2 | TEMPERATURE: 97.2 F | SYSTOLIC BLOOD PRESSURE: 101 MMHG | HEIGHT: 68 IN | RESPIRATION RATE: 13 BRPM | OXYGEN SATURATION: 99 % | WEIGHT: 170.9 LBS | DIASTOLIC BLOOD PRESSURE: 57 MMHG | HEART RATE: 63 BPM

## 2020-05-19 DIAGNOSIS — O02.1 MISSED ABORTION: Primary | ICD-10-CM

## 2020-05-19 PROCEDURE — 00000159 ZZHCL STATISTIC H-SEND OUTS PREP: Performed by: OBSTETRICS & GYNECOLOGY

## 2020-05-19 PROCEDURE — 00000093 ZZHCL STATISTIC COURTESY CONSULT: Performed by: OBSTETRICS & GYNECOLOGY

## 2020-05-19 PROCEDURE — 88305 TISSUE EXAM BY PATHOLOGIST: CPT | Mod: 26 | Performed by: OBSTETRICS & GYNECOLOGY

## 2020-05-19 PROCEDURE — 88305 TISSUE EXAM BY PATHOLOGIST: CPT | Performed by: OBSTETRICS & GYNECOLOGY

## 2020-05-19 PROCEDURE — 25000128 H RX IP 250 OP 636: Performed by: NURSE ANESTHETIST, CERTIFIED REGISTERED

## 2020-05-19 PROCEDURE — 37000008 ZZH ANESTHESIA TECHNICAL FEE, 1ST 30 MIN: Performed by: OBSTETRICS & GYNECOLOGY

## 2020-05-19 PROCEDURE — 27210794 ZZH OR GENERAL SUPPLY STERILE: Performed by: OBSTETRICS & GYNECOLOGY

## 2020-05-19 PROCEDURE — 40000170 ZZH STATISTIC PRE-PROCEDURE ASSESSMENT II: Performed by: OBSTETRICS & GYNECOLOGY

## 2020-05-19 PROCEDURE — 36000050 ZZH SURGERY LEVEL 2 1ST 30 MIN: Performed by: OBSTETRICS & GYNECOLOGY

## 2020-05-19 PROCEDURE — 37000009 ZZH ANESTHESIA TECHNICAL FEE, EACH ADDTL 15 MIN: Performed by: OBSTETRICS & GYNECOLOGY

## 2020-05-19 PROCEDURE — 25000132 ZZH RX MED GY IP 250 OP 250 PS 637: Performed by: OBSTETRICS & GYNECOLOGY

## 2020-05-19 PROCEDURE — 25000128 H RX IP 250 OP 636: Performed by: OBSTETRICS & GYNECOLOGY

## 2020-05-19 PROCEDURE — 25000125 ZZHC RX 250: Performed by: OBSTETRICS & GYNECOLOGY

## 2020-05-19 PROCEDURE — 25800030 ZZH RX IP 258 OP 636: Performed by: NURSE ANESTHETIST, CERTIFIED REGISTERED

## 2020-05-19 PROCEDURE — 71000027 ZZH RECOVERY PHASE 2 EACH 15 MINS: Performed by: OBSTETRICS & GYNECOLOGY

## 2020-05-19 PROCEDURE — 71000012 ZZH RECOVERY PHASE 1 LEVEL 1 FIRST HR: Performed by: OBSTETRICS & GYNECOLOGY

## 2020-05-19 RX ORDER — ONDANSETRON 4 MG/1
4 TABLET, ORALLY DISINTEGRATING ORAL EVERY 30 MIN PRN
Status: DISCONTINUED | OUTPATIENT
Start: 2020-05-19 | End: 2020-05-19 | Stop reason: HOSPADM

## 2020-05-19 RX ORDER — ONDANSETRON 2 MG/ML
4 INJECTION INTRAMUSCULAR; INTRAVENOUS EVERY 30 MIN PRN
Status: DISCONTINUED | OUTPATIENT
Start: 2020-05-19 | End: 2020-05-19 | Stop reason: HOSPADM

## 2020-05-19 RX ORDER — SODIUM CHLORIDE, SODIUM LACTATE, POTASSIUM CHLORIDE, CALCIUM CHLORIDE 600; 310; 30; 20 MG/100ML; MG/100ML; MG/100ML; MG/100ML
INJECTION, SOLUTION INTRAVENOUS CONTINUOUS PRN
Status: DISCONTINUED | OUTPATIENT
Start: 2020-05-19 | End: 2020-05-19

## 2020-05-19 RX ORDER — MAGNESIUM HYDROXIDE 1200 MG/15ML
LIQUID ORAL PRN
Status: DISCONTINUED | OUTPATIENT
Start: 2020-05-19 | End: 2020-05-19 | Stop reason: HOSPADM

## 2020-05-19 RX ORDER — KETOROLAC TROMETHAMINE 30 MG/ML
30 INJECTION, SOLUTION INTRAMUSCULAR; INTRAVENOUS ONCE
Status: COMPLETED | OUTPATIENT
Start: 2020-05-19 | End: 2020-05-19

## 2020-05-19 RX ORDER — ACETAMINOPHEN 325 MG/1
975 TABLET ORAL ONCE
Status: COMPLETED | OUTPATIENT
Start: 2020-05-19 | End: 2020-05-19

## 2020-05-19 RX ORDER — BUPIVACAINE HYDROCHLORIDE 2.5 MG/ML
INJECTION, SOLUTION EPIDURAL; INFILTRATION; INTRACAUDAL
Status: DISCONTINUED
Start: 2020-05-19 | End: 2020-05-19 | Stop reason: HOSPADM

## 2020-05-19 RX ORDER — PROPOFOL 10 MG/ML
INJECTION, EMULSION INTRAVENOUS CONTINUOUS PRN
Status: DISCONTINUED | OUTPATIENT
Start: 2020-05-19 | End: 2020-05-19

## 2020-05-19 RX ORDER — BUPIVACAINE HYDROCHLORIDE 2.5 MG/ML
INJECTION, SOLUTION INFILTRATION; PERINEURAL PRN
Status: DISCONTINUED | OUTPATIENT
Start: 2020-05-19 | End: 2020-05-19 | Stop reason: HOSPADM

## 2020-05-19 RX ORDER — FENTANYL CITRATE 50 UG/ML
INJECTION, SOLUTION INTRAMUSCULAR; INTRAVENOUS PRN
Status: DISCONTINUED | OUTPATIENT
Start: 2020-05-19 | End: 2020-05-19

## 2020-05-19 RX ORDER — DOXYCYCLINE 100 MG/10ML
100 INJECTION, POWDER, LYOPHILIZED, FOR SOLUTION INTRAVENOUS
Status: COMPLETED | OUTPATIENT
Start: 2020-05-19 | End: 2020-05-19

## 2020-05-19 RX ORDER — MEPERIDINE HYDROCHLORIDE 25 MG/ML
12.5 INJECTION INTRAMUSCULAR; INTRAVENOUS; SUBCUTANEOUS
Status: DISCONTINUED | OUTPATIENT
Start: 2020-05-19 | End: 2020-05-19 | Stop reason: HOSPADM

## 2020-05-19 RX ORDER — HYDROMORPHONE HYDROCHLORIDE 1 MG/ML
.3-.5 INJECTION, SOLUTION INTRAMUSCULAR; INTRAVENOUS; SUBCUTANEOUS EVERY 10 MIN PRN
Status: DISCONTINUED | OUTPATIENT
Start: 2020-05-19 | End: 2020-05-19 | Stop reason: HOSPADM

## 2020-05-19 RX ORDER — DOXYCYCLINE HYCLATE 100 MG
100 TABLET ORAL 2 TIMES DAILY
Qty: 4 TABLET | Refills: 0 | Status: SHIPPED | OUTPATIENT
Start: 2020-05-19 | End: 2020-05-21

## 2020-05-19 RX ORDER — IBUPROFEN 600 MG/1
600 TABLET, FILM COATED ORAL EVERY 6 HOURS PRN
Qty: 30 TABLET | Refills: 0 | Status: ON HOLD | OUTPATIENT
Start: 2020-05-19 | End: 2021-04-18

## 2020-05-19 RX ORDER — OXYCODONE HYDROCHLORIDE 5 MG/1
5 TABLET ORAL
Status: DISCONTINUED | OUTPATIENT
Start: 2020-05-19 | End: 2020-05-19 | Stop reason: HOSPADM

## 2020-05-19 RX ORDER — FENTANYL CITRATE 50 UG/ML
25-50 INJECTION, SOLUTION INTRAMUSCULAR; INTRAVENOUS
Status: DISCONTINUED | OUTPATIENT
Start: 2020-05-19 | End: 2020-05-19 | Stop reason: HOSPADM

## 2020-05-19 RX ORDER — ONDANSETRON 2 MG/ML
INJECTION INTRAMUSCULAR; INTRAVENOUS PRN
Status: DISCONTINUED | OUTPATIENT
Start: 2020-05-19 | End: 2020-05-19

## 2020-05-19 RX ORDER — SODIUM CHLORIDE, SODIUM LACTATE, POTASSIUM CHLORIDE, CALCIUM CHLORIDE 600; 310; 30; 20 MG/100ML; MG/100ML; MG/100ML; MG/100ML
INJECTION, SOLUTION INTRAVENOUS CONTINUOUS
Status: DISCONTINUED | OUTPATIENT
Start: 2020-05-19 | End: 2020-05-19 | Stop reason: HOSPADM

## 2020-05-19 RX ORDER — NALOXONE HYDROCHLORIDE 0.4 MG/ML
.1-.4 INJECTION, SOLUTION INTRAMUSCULAR; INTRAVENOUS; SUBCUTANEOUS
Status: DISCONTINUED | OUTPATIENT
Start: 2020-05-19 | End: 2020-05-19 | Stop reason: HOSPADM

## 2020-05-19 RX ADMIN — ONDANSETRON 4 MG: 2 INJECTION INTRAMUSCULAR; INTRAVENOUS at 10:10

## 2020-05-19 RX ADMIN — MIDAZOLAM 2 MG: 1 INJECTION INTRAMUSCULAR; INTRAVENOUS at 10:06

## 2020-05-19 RX ADMIN — PROPOFOL 200 MCG/KG/MIN: 10 INJECTION, EMULSION INTRAVENOUS at 10:08

## 2020-05-19 RX ADMIN — DOXYCYCLINE 100 MG: 100 INJECTION, POWDER, LYOPHILIZED, FOR SOLUTION INTRAVENOUS at 10:08

## 2020-05-19 RX ADMIN — FENTANYL CITRATE 25 MCG: 50 INJECTION, SOLUTION INTRAMUSCULAR; INTRAVENOUS at 10:18

## 2020-05-19 RX ADMIN — KETOROLAC TROMETHAMINE 30 MG: 30 INJECTION, SOLUTION INTRAMUSCULAR at 10:09

## 2020-05-19 RX ADMIN — ACETAMINOPHEN 975 MG: 325 TABLET, FILM COATED ORAL at 08:36

## 2020-05-19 RX ADMIN — SODIUM CHLORIDE, POTASSIUM CHLORIDE, SODIUM LACTATE AND CALCIUM CHLORIDE: 600; 310; 30; 20 INJECTION, SOLUTION INTRAVENOUS at 10:05

## 2020-05-19 RX ADMIN — FENTANYL CITRATE 50 MCG: 50 INJECTION, SOLUTION INTRAMUSCULAR; INTRAVENOUS at 10:08

## 2020-05-19 RX ADMIN — FENTANYL CITRATE 25 MCG: 50 INJECTION, SOLUTION INTRAMUSCULAR; INTRAVENOUS at 10:12

## 2020-05-19 ASSESSMENT — ENCOUNTER SYMPTOMS: SEIZURES: 0

## 2020-05-19 ASSESSMENT — MIFFLIN-ST. JEOR: SCORE: 1533.7

## 2020-05-19 ASSESSMENT — COPD QUESTIONNAIRES: COPD: 0

## 2020-05-19 NOTE — ANESTHESIA CARE TRANSFER NOTE
Patient: Sheila Calloway    Procedure(s):  SUCTION DILATION AND CURETTAGE    Diagnosis: Missed  [O02.1]  Diagnosis Additional Information: No value filed.    Anesthesia Type:   MAC     Note:  Airway :Room Air  Patient transferred to:PACU  Comments: Anesthesia Care Note    Patient: Sheila Calloway    Transferred to: PACU    Patient vital signs: Stable    Airway: None    Monitors placed. VSS. PIV patent. No change in dentition. Report given to JENNI Nguyen CRNA   2020  Handoff Report: Identifed the Patient, Identified the Reponsible Provider, Reviewed the pertinent medical history, Discussed the surgical course, Reviewed Intra-OP anesthesia mangement and issues during anesthesia, Set expectations for post-procedure period and Allowed opportunity for questions and acknowledgement of understanding      Vitals: (Last set prior to Anesthesia Care Transfer)    CRNA VITALS  2020 1004 - 2020 1038      2020             NIBP:  106/50    Pulse:  78    NIBP Mean:  64    SpO2:  96 %    Resp Rate (set):  10                Electronically Signed By: SANDRA Kitchen CRNA  May 19, 2020  10:38 AM

## 2020-05-19 NOTE — DISCHARGE INSTRUCTIONS
Same Day Surgery Discharge Instructions for  Sedation and General Anesthesia       It's not unusual to feel dizzy, light-headed or faint for up to 24 hours after surgery or while taking pain medication.  If you have these symptoms: sit for a few minutes before standing and have someone assist you when you get up to walk or use the bathroom.      You should rest and relax for the next 24 hours. We recommend you make arrangements to have an adult stay with you for at least 24 hours after your discharge.  Avoid hazardous and strenuous activity.      DO NOT DRIVE any vehicle or operate mechanical equipment for 24 hours following the end of your surgery.  Even though you may feel normal, your reactions may be affected by the medication you have received.      Do not drink alcoholic beverages for 24 hours following surgery.       Slowly progress to your regular diet as you feel able. It's not unusual to feel nauseated and/or vomit after receiving anesthesia.  If you develop these symptoms, drink clear liquids (apple juice, ginger ale, broth, 7-up, etc. ) until you feel better.  If your nausea and vomiting persists for 24 hours, please notify your surgeon.        All narcotic pain medications, along with inactivity and anesthesia, can cause constipation. Drinking plenty of liquids and increasing fiber intake will help.      For any questions of a medical nature, call your surgeon.      Do not make important decisions for 24 hours.      If you had general anesthesia, you may have a sore throat for a couple of days related to the breathing tube used during surgery.  You may use Cepacol lozenges to help with this discomfort.  If it worsens or if you develop a fever, contact your surgeon.       If you feel your pain is not well managed with the pain medications prescribed by your surgeon, please contact your surgeon's office to let them know so they can address your concerns.       CoVid 19 Information    We want to give you  information regarding Covid. Please consult your primary care provider with any questions you might have.     Patient who have symptoms (cough, fever, or shortness of breath), need to isolate for 7 days from when symptoms started OR 72 hours after fever resolves (without fever reducing medications) AND improvement of respiratory symptoms (whichever is longer).      Isolate yourself at home (in own room/own bathroom if possible)    Do Not allow any visitors    Do Not go to work or school    Do Not go to Mosque,  centers, shopping, or other public places.    Do Not shake hands.    Avoid close and intimate contact with others (hugging, kissing).    Follow CDC recommendations for household cleaning of frequently touched services.     After the initial 7 days, continue to isolate yourself from household members as much as possible. To continue decrease the risk of community spread and exposure, you and any members of your household should limit activities in public for 14 days after starting home isolation.     You can reference the following CDC link for helpful home isolation/care tips:  https://www.cdc.gov/coronavirus/2019-ncov/downloads/10Things.pdf    Protect Others:    Cover Your Mouth and Nose with a mask, disposable tissue or wash cloth to avoid spreading germs to others.    Wash your hands and face frequently with soap and water    Call Your Primary Doctor If: Breathing difficulty develops or you become worse.    For more information about COVID19 and options for caring for yourself at home, please visit the CDC website at https://www.cdc.gov/coronavirus/2019-ncov/about/steps-when-sick.html  For more options for care at [facility], please visit our website at [email or web address]          [facility]  D&C OR Laparoscopy  Discharge Instructions    ACTIVITY:  You may restart normal activities as your abdominal discomfort disappears.  You may expect some  discomfort under the ribs and shoulder area for the first 24 hours.  In nearly all cases, this will disappear shortly after the first day.  It is certainly permissible to climb stairs, shower, and do ordinary, quiet activities.  More vigorous activities such as sports, intercourse and work may be resumed in 48-72 hours as seems to befit your situation.    OFFICE VISIT:  Please call a day or two after your surgery to make an appointment in approximately 2 weeks to discuss the results of your surgery and have your check-up.    VAGINAL DISCHARGE:  You may have some bloody vaginal discharge for as long as one week.  Ordinary tampons may be used after 3-4 days.     INCISIONAL CARE: Keep incisions clean and dry for 24 hours.  You may shower tomorrow.  No bathing or swimming for 3-5 days. If you have steri-strips, they should remain in place 3-5 days, after which they can be removed.      TEMPERATURE:  If you develop a temperature elevation of 101  or higher, please call our office immediately.    RESTRICTIONS:  Due to the effects of general anesthesia, please do not drive a car, drink alcoholic beverages, nor operate complex machinery in the first 24 hours following surgery.    PLEASE FEEL FREE TO CALL OUR OFFICE IF ANY QUESTIONS OR PROBLEMS ARISE.    [facility]     _______________________________________________________________________________                   [facility]              [address]  [address]            [facility] [address]  [place] [address]            [phone] (Telephone)                                               [phone] (Telephone)            [phone] (Fax)  [phone] (Fax)            [facility]      ____________________________________________    Our hearts go out to you at this  difficult time. Be assured that there is no right way to find comfort and support. It may take time to find out what works for you.  Please do not hesitate to ask for support from our nurses, social workers, or chaplains.  After you leave the hospital, if you need help finding support resources, please call [phone].  [facility] hosts a support group for anyone who has had a pregnancy loss providing a supportive place to learn and share. Couples are encouraged to attend together.     Where: [facility], [facility], [facility]  When: 1st and 3rd Thursday of each month, 5:00pm - 6:30pm  To register, contact:    [place] *[phone] [email or web address]   [place] *[phone] [email or web address]    ______________________________________________

## 2020-05-19 NOTE — ANESTHESIA POSTPROCEDURE EVALUATION
Patient: Sheila Calloway    Procedure(s):  SUCTION DILATION AND CURETTAGE    Diagnosis:Missed  [O02.1]  Diagnosis Additional Information: No value filed.    Anesthesia Type:  MAC    Note:  Anesthesia Post Evaluation    Patient location during evaluation: PACU  Patient participation: Able to fully participate in evaluation  Level of consciousness: awake and awake and alert  Pain management: adequate  Airway patency: patent  Cardiovascular status: acceptable  Respiratory status: acceptable  Hydration status: acceptable  PONV: none     Anesthetic complications: None          Last vitals:  Vitals:    20 1045 20 1100 20 1108   BP: 102/53 101/52 101/57   Pulse: 63 61 63   Resp: 16 16 13   Temp:      SpO2: 96% 100% 99%         Electronically Signed By: Hayde Dale  May 19, 2020  2:01 PM

## 2020-05-19 NOTE — OP NOTE
Gynecology Operative Note     Pre-operative diagnosis:  Missed  at 6 weeks    Post-operative diagnosis  Missed  at 6 weeks   Procedure:  Suction Dilation and Curettage   Surgeon:  Paris Garber MD    Anesthesia:  MAC   Estimated blood loss:  50 ml    Drains:  None    Specimen:  Products of conception for permanent and for anora testing   Indication:  30 year old [id number] at 8 weeks menstrual dates with missed  diagnosed by ultrasound and crown rump length measuring 6 weeks    Findings:  Uterus retroverted, 7 weeks size, cervix parous, closed. Moderate return of products of conception.    Complications:  None    Condition:  Stable   Transferred to post-anesthesia recovery        Procedure:   The patient was taken to the operating room, where MAC anesthesia was initiated without difficulty. She voided in the preoperative area. The patient was placed in dorsal lithotomy position and was prepped and draped in the usual sterile fashion. Time out was performed and the proper procedure and patient were identified.     A graves speculum was placed in the vagina. A single-toothed tenaculum was applied to the anterior lip of the cervix. A paracervical block was performed infiltrating 10 cc of 0.25% marcaine plain at the 4 and 8 o'clock positions at the cervicovaginal junction. A peres dilator was inserted through the cervical os with no resistance and the cervix was progressively dilated to 21 fr. A curved rigid size 7 suction cannula was introduced to the fundus and suction curettage was performed in 3 passes with moderate return of tissue.  One pass with the sharp curette was performed and a uniform gritty sensation was noted. The tenaculum was removed and the cervix was noted to be hemostatic. The speculum was removed.     Sponge, lap, and needle counts were correct x 2. Pt is O positive and rhogam was not given.  Doxycycline was given preoperatively and doses will be continued for 2 days post op.       Paris Garber MD  [facility]

## 2020-05-19 NOTE — ANESTHESIA PREPROCEDURE EVALUATION
"Anesthesia Pre-Procedure Evaluation    Patient: Sheila Calloway   MRN: [id number] : 1990          Preoperative Diagnosis: Missed  [O02.1]    Procedure(s):  SUCTION DILATION AND CURETTAGE    History reviewed. No pertinent past medical history.  Past Surgical History:   Procedure Laterality Date     DILATION AND CURETTAGE SUCTION N/A 2017    Procedure: DILATION AND CURETTAGE SUCTION;  SUCTION DILATION AND CURETTAGE ;  Surgeon: Paris Garber MD;  Location: [facility] TOOTH EXTRACTION W/FORCEP      wisdom teeth extraction       Anesthesia Evaluation     .             ROS/MED HX    ENT/Pulmonary:      (-) asthma, COPD and sleep apnea   Neurologic:      (-) seizures and CVA   Cardiovascular:        (-) dyslipidemia   METS/Exercise Tolerance:     Hematologic:         Musculoskeletal:         GI/Hepatic:        (-) GERD and liver disease   Renal/Genitourinary:         Endo:      (-) thyroid disease   Psychiatric:         Infectious Disease:         Malignancy:         Other: Comment: Missed ab                         Physical Exam  Normal systems: dental    Airway   Mallampati: II  TM distance: >3 FB  Neck ROM: full    Dental     Cardiovascular       Pulmonary             Lab Results   Component Value Date    WBC 9.9 2018    HGB 10.1 (L) 2018    HCT 35.4 2018     2018    BUN 10 2018    CR 0.69 2018    ALT 16 2018    AST 14 2018       Preop Vitals  BP Readings from Last 3 Encounters:   20 109/68   18 127/84   17 107/63    Pulse Readings from Last 3 Encounters:   18 87   17 87      Resp Readings from Last 3 Encounters:   20 16   18 16   17 16    SpO2 Readings from Last 3 Encounters:   20 100%   12/15/18 99%   17 97%      Temp Readings from Last 1 Encounters:   20 36.4  C (97.5  F) (Oral)    Ht Readings from Last 1 Encounters:   20 1.727 m (5' 8\")      Wt Readings from " "Last 1 Encounters:   05/19/20 77.5 kg (170 lb 14.4 oz)    Estimated body mass index is 25.99 kg/m  as calculated from the following:    Height as of this encounter: 1.727 m (5' 8\").    Weight as of this encounter: 77.5 kg (170 lb 14.4 oz).       Anesthesia Plan      History & Physical Review  History and physical reviewed and following examination; no interval change.    ASA Status:  1 .    NPO Status:  > 8 hours    Plan for MAC            Postoperative Care      Consents  Anesthetic plan, risks, benefits and alternatives discussed with:  Patient..                 Hayde Dale  "

## 2020-05-20 LAB — COPATH REPORT: NORMAL

## 2020-05-27 ENCOUNTER — MEDICAL CORRESPONDENCE (OUTPATIENT)
Dept: HEALTH INFORMATION MANAGEMENT | Facility: CLINIC | Age: 30
End: 2020-05-27

## 2020-05-29 ENCOUNTER — TRANSFERRED RECORDS (OUTPATIENT)
Dept: HEALTH INFORMATION MANAGEMENT | Facility: CLINIC | Age: 30
End: 2020-05-29

## 2020-06-01 ENCOUNTER — TRANSCRIBE ORDERS (OUTPATIENT)
Dept: MATERNAL FETAL MEDICINE | Facility: CLINIC | Age: 30
End: 2020-06-01

## 2020-06-01 DIAGNOSIS — O26.90 PREGNANCY RELATED CONDITION, ANTEPARTUM: Primary | ICD-10-CM

## 2020-06-11 ENCOUNTER — VIRTUAL VISIT (OUTPATIENT)
Dept: MATERNAL FETAL MEDICINE | Facility: CLINIC | Age: 30
End: 2020-06-11
Attending: OBSTETRICS & GYNECOLOGY
Payer: COMMERCIAL

## 2020-06-11 DIAGNOSIS — N96 RECURRENT PREGNANCY LOSS WITHOUT CURRENT PREGNANCY: Primary | ICD-10-CM

## 2020-06-11 DIAGNOSIS — O26.90 PREGNANCY RELATED CONDITION, ANTEPARTUM: ICD-10-CM

## 2020-06-11 PROCEDURE — 96040 ZZH GENETIC COUNSELING, EACH 30 MINUTES: CPT | Mod: 95,ZF | Performed by: GENETIC COUNSELOR, MS

## 2020-06-11 NOTE — PROGRESS NOTES
[facility]  Genetic Counseling Consult    Patient: Sheila Calloway YOB: 1990   Date of Service: 20      Sheila Calloway was evaluated via a billable telephone visit at [facility] for genetic consultation given recurrent pregnancy loss.    The patient has been notified of following:    This telephone visit will be conducted via a call between you and your physician/provider. We have found that certain health care needs can be provided without the need for a physical exam. This service lets us provide the care you need with a short phone conversation. If a prescription is necessary we can send it directly to your pharmacy. If lab work is needed we can place an order for that and you can then stop by our lab to have the test done at a later time.     If during the course of the call the provider feels a telephone visit is not appropriate, you will not be charged for this service.       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 She has had 3 first trimester pregnancy losses as well as one full term pregnancy resulting in their now 18 month old son.     They have had the opportunity to discuss their pregnancy history with Sheila's OB provider and many of her questions were answered through that discussion. Sheila has looked into trisomy 16 and trisomy 6 but continues to have questions regarding future pregnancies.     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 Her and her  have had no difficulty conceiving.        We reviewed that at least 8-20% of the recognized pregnancies end in miscarriage, with over 80% of losses occurring in the first trimester. An estimated 70-80% of first trimester losses are due to chromosome abnormalities.  The risk for a miscarriage increases with advancing maternal age due to a higher incidence of conceptions with a chromosomal aneuploidy.       Sheila and her partner have reportedly had normal karyotypes. This testing can determine if an individual is a carrier of a balanced translocation which can increase the chance for a genetic abnormality in a pregnancy.      Family History:   A three-generation pedigree was not obtained. They deny a known family history of multiple miscarriages, stillbirths, birth defects, mental retardation, known genetic conditions, and consanguinity.       Risk Assessment for Chromosome Conditions:   We explained that the risk for fetal chromosome abnormalities increases with maternal age. We discussed specific features of common chromosome abnormalities, including Down syndrome, trisomy 13, trisomy 18, and sex chromosome trisomies. The below estimates are based on Sheila's current age.      - At age 30 at midtrimester, the risk to have a baby with Down syndrome is 1 in 508.     - At age 30 at midtrimester, the risk to have a baby with any chromosome abnormality is 1 in 254.     We reviewed available information regarding the risk for aneuploidy in a pregnancy after experiencing a pregnancy loss due to aneuploidy. Research estimates an increased chance (1.3 times the age related risk) for aneuploidy after a confirmed trisomic loss. Less research is available regarding the risk for aneuploidy after two trisomic losses. Presumably, the risk would be higher, " however, data is unavailable with regards to what those risks could be.     We briefly discussed that the only way to avoid a trisomic conception is to complete in vitro fertilization and preimplantation genetic screening (PGT-A) for embryo selection. Sheila and her partner strongly desire a future pregnancy and plan to attempt natural conception. We discussed genetic screening that is available to assess for viable trisomies such as Down syndrome, trisomy 18, and trisomy 13. This can be completed anytime after 10 weeks gestation and would be available through her OB office or M.       Phone call contact time  Call Started at 10:15  Call Ended at 10:37    Mickie Stroud MS, [facility]  Maternal Fetal Medicine  [facility]  Ph: [phone]  [email or web address]

## 2021-03-24 LAB — GROUP B STREP PCR: NEGATIVE

## 2021-03-29 ENCOUNTER — HOSPITAL ENCOUNTER (OUTPATIENT)
Facility: CLINIC | Age: 31
End: 2021-03-29
Admitting: OBSTETRICS & GYNECOLOGY
Payer: COMMERCIAL

## 2021-04-13 DIAGNOSIS — Z20.822 ENCOUNTER FOR LABORATORY TESTING FOR COVID-19 VIRUS: Primary | ICD-10-CM

## 2021-04-13 DIAGNOSIS — Z11.59 ENCOUNTER FOR SCREENING FOR OTHER VIRAL DISEASES: ICD-10-CM

## 2021-04-14 DIAGNOSIS — Z20.822 ENCOUNTER FOR LABORATORY TESTING FOR COVID-19 VIRUS: ICD-10-CM

## 2021-04-14 LAB
LABORATORY COMMENT REPORT: NORMAL
SARS-COV-2 RNA RESP QL NAA+PROBE: NEGATIVE
SARS-COV-2 RNA RESP QL NAA+PROBE: NORMAL
SPECIMEN SOURCE: NORMAL
SPECIMEN SOURCE: NORMAL

## 2021-04-14 PROCEDURE — U0003 INFECTIOUS AGENT DETECTION BY NUCLEIC ACID (DNA OR RNA); SEVERE ACUTE RESPIRATORY SYNDROME CORONAVIRUS 2 (SARS-COV-2) (CORONAVIRUS DISEASE [COVID-19]), AMPLIFIED PROBE TECHNIQUE, MAKING USE OF HIGH THROUGHPUT TECHNOLOGIES AS DESCRIBED BY CMS-2020-01-R: HCPCS | Performed by: OBSTETRICS & GYNECOLOGY

## 2021-04-14 PROCEDURE — U0005 INFEC AGEN DETEC AMPLI PROBE: HCPCS | Performed by: OBSTETRICS & GYNECOLOGY

## 2021-04-15 ENCOUNTER — HOSPITAL ENCOUNTER (OUTPATIENT)
Dept: LAB | Facility: CLINIC | Age: 31
Discharge: HOME | End: 2021-04-15
Attending: OBSTETRICS & GYNECOLOGY | Admitting: OBSTETRICS & GYNECOLOGY
Payer: COMMERCIAL

## 2021-04-15 DIAGNOSIS — Z01.818 PRE-OPERATIVE EXAMINATION: Primary | ICD-10-CM

## 2021-04-15 LAB
ABO + RH BLD: NORMAL
ABO + RH BLD: NORMAL
BLD GP AB SCN SERPL QL: NORMAL
BLOOD BANK CMNT PATIENT-IMP: NORMAL
ERYTHROCYTE [DISTWIDTH] IN BLOOD BY AUTOMATED COUNT: 12.2 % (ref 10–15)
HCT VFR BLD AUTO: 35.2 % (ref 35–47)
HGB BLD-MCNC: 12 G/DL (ref 11.7–15.7)
MCH RBC QN AUTO: 29.9 PG (ref 26.5–33)
MCHC RBC AUTO-ENTMCNC: 34.1 G/DL (ref 31.5–36.5)
MCV RBC AUTO: 88 FL (ref 78–100)
PLATELET # BLD AUTO: 153 10E9/L (ref 150–450)
RBC # BLD AUTO: 4.01 10E12/L (ref 3.8–5.2)
SPECIMEN EXP DATE BLD: NORMAL
T PALLIDUM AB SER QL: NONREACTIVE
WBC # BLD AUTO: 7.7 10E9/L (ref 4–11)

## 2021-04-15 PROCEDURE — 85027 COMPLETE CBC AUTOMATED: CPT | Performed by: OBSTETRICS & GYNECOLOGY

## 2021-04-15 PROCEDURE — 86900 BLOOD TYPING SEROLOGIC ABO: CPT | Performed by: OBSTETRICS & GYNECOLOGY

## 2021-04-15 PROCEDURE — 86901 BLOOD TYPING SEROLOGIC RH(D): CPT | Performed by: OBSTETRICS & GYNECOLOGY

## 2021-04-15 PROCEDURE — 86780 TREPONEMA PALLIDUM: CPT | Performed by: OBSTETRICS & GYNECOLOGY

## 2021-04-15 PROCEDURE — 86850 RBC ANTIBODY SCREEN: CPT | Performed by: OBSTETRICS & GYNECOLOGY

## 2021-04-16 ENCOUNTER — ANESTHESIA (OUTPATIENT)
Dept: OBGYN | Facility: CLINIC | Age: 31
End: 2021-04-16
Payer: COMMERCIAL

## 2021-04-16 ENCOUNTER — ANESTHESIA EVENT (OUTPATIENT)
Dept: OBGYN | Facility: CLINIC | Age: 31
End: 2021-04-16
Payer: COMMERCIAL

## 2021-04-16 ENCOUNTER — HOSPITAL ENCOUNTER (INPATIENT)
Facility: CLINIC | Age: 31
LOS: 2 days | Discharge: HOME | End: 2021-04-18
Attending: OBSTETRICS & GYNECOLOGY | Admitting: OBSTETRICS & GYNECOLOGY
Payer: COMMERCIAL

## 2021-04-16 DIAGNOSIS — Z98.891 S/P CESAREAN SECTION: Primary | ICD-10-CM

## 2021-04-16 PROCEDURE — 250N000011 HC RX IP 250 OP 636: Performed by: OBSTETRICS & GYNECOLOGY

## 2021-04-16 PROCEDURE — 250N000009 HC RX 250: Performed by: REGISTERED NURSE

## 2021-04-16 PROCEDURE — 86780 TREPONEMA PALLIDUM: CPT | Performed by: OBSTETRICS & GYNECOLOGY

## 2021-04-16 PROCEDURE — 710N000009 HC RECOVERY PHASE 1, LEVEL 1, PER MIN: Performed by: OBSTETRICS & GYNECOLOGY

## 2021-04-16 PROCEDURE — 258N000003 HC RX IP 258 OP 636: Performed by: OBSTETRICS & GYNECOLOGY

## 2021-04-16 PROCEDURE — 250N000009 HC RX 250: Performed by: OBSTETRICS & GYNECOLOGY

## 2021-04-16 PROCEDURE — 272N000001 HC OR GENERAL SUPPLY STERILE: Performed by: OBSTETRICS & GYNECOLOGY

## 2021-04-16 PROCEDURE — 120N000012 HC R&B POSTPARTUM

## 2021-04-16 PROCEDURE — 360N000076 HC SURGERY LEVEL 3, PER MIN: Performed by: OBSTETRICS & GYNECOLOGY

## 2021-04-16 PROCEDURE — 370N000017 HC ANESTHESIA TECHNICAL FEE, PER MIN: Performed by: OBSTETRICS & GYNECOLOGY

## 2021-04-16 PROCEDURE — 258N000003 HC RX IP 258 OP 636: Performed by: REGISTERED NURSE

## 2021-04-16 PROCEDURE — 250N000013 HC RX MED GY IP 250 OP 250 PS 637: Performed by: OBSTETRICS & GYNECOLOGY

## 2021-04-16 PROCEDURE — 250N000011 HC RX IP 250 OP 636: Performed by: REGISTERED NURSE

## 2021-04-16 RX ORDER — METHYLERGONOVINE MALEATE 0.2 MG/ML
200 INJECTION INTRAVENOUS
Status: DISCONTINUED | OUTPATIENT
Start: 2021-04-16 | End: 2021-04-18 | Stop reason: HOSPADM

## 2021-04-16 RX ORDER — DEXTROSE, SODIUM CHLORIDE, SODIUM LACTATE, POTASSIUM CHLORIDE, AND CALCIUM CHLORIDE 5; .6; .31; .03; .02 G/100ML; G/100ML; G/100ML; G/100ML; G/100ML
INJECTION, SOLUTION INTRAVENOUS CONTINUOUS
Status: DISCONTINUED | OUTPATIENT
Start: 2021-04-16 | End: 2021-04-18 | Stop reason: HOSPADM

## 2021-04-16 RX ORDER — HYDROCORTISONE 2.5 %
CREAM (GRAM) TOPICAL 3 TIMES DAILY PRN
Status: DISCONTINUED | OUTPATIENT
Start: 2021-04-16 | End: 2021-04-18 | Stop reason: HOSPADM

## 2021-04-16 RX ORDER — SODIUM CHLORIDE, SODIUM LACTATE, POTASSIUM CHLORIDE, CALCIUM CHLORIDE 600; 310; 30; 20 MG/100ML; MG/100ML; MG/100ML; MG/100ML
INJECTION, SOLUTION INTRAVENOUS CONTINUOUS
Status: DISCONTINUED | OUTPATIENT
Start: 2021-04-16 | End: 2021-04-16

## 2021-04-16 RX ORDER — CITRIC ACID/SODIUM CITRATE 334-500MG
30 SOLUTION, ORAL ORAL
Status: COMPLETED | OUTPATIENT
Start: 2021-04-16 | End: 2021-04-16

## 2021-04-16 RX ORDER — NALOXONE HYDROCHLORIDE 0.4 MG/ML
0.2 INJECTION, SOLUTION INTRAMUSCULAR; INTRAVENOUS; SUBCUTANEOUS
Status: DISCONTINUED | OUTPATIENT
Start: 2021-04-16 | End: 2021-04-18 | Stop reason: HOSPADM

## 2021-04-16 RX ORDER — IBUPROFEN 400 MG/1
800 TABLET, FILM COATED ORAL EVERY 6 HOURS
Status: DISCONTINUED | OUTPATIENT
Start: 2021-04-17 | End: 2021-04-18 | Stop reason: HOSPADM

## 2021-04-16 RX ORDER — ONDANSETRON 2 MG/ML
4 INJECTION INTRAMUSCULAR; INTRAVENOUS EVERY 6 HOURS PRN
Status: DISCONTINUED | OUTPATIENT
Start: 2021-04-16 | End: 2021-04-18 | Stop reason: HOSPADM

## 2021-04-16 RX ORDER — CEFAZOLIN SODIUM 1 G/3ML
1 INJECTION, POWDER, FOR SOLUTION INTRAMUSCULAR; INTRAVENOUS SEE ADMIN INSTRUCTIONS
Status: DISCONTINUED | OUTPATIENT
Start: 2021-04-16 | End: 2021-04-16

## 2021-04-16 RX ORDER — NALOXONE HYDROCHLORIDE 0.4 MG/ML
0.4 INJECTION, SOLUTION INTRAMUSCULAR; INTRAVENOUS; SUBCUTANEOUS
Status: DISCONTINUED | OUTPATIENT
Start: 2021-04-16 | End: 2021-04-18 | Stop reason: HOSPADM

## 2021-04-16 RX ORDER — KETOROLAC TROMETHAMINE 30 MG/ML
30 INJECTION, SOLUTION INTRAMUSCULAR; INTRAVENOUS EVERY 6 HOURS
Status: COMPLETED | OUTPATIENT
Start: 2021-04-16 | End: 2021-04-17

## 2021-04-16 RX ORDER — LIDOCAINE 40 MG/G
CREAM TOPICAL
Status: DISCONTINUED | OUTPATIENT
Start: 2021-04-16 | End: 2021-04-16

## 2021-04-16 RX ORDER — EPHEDRINE SULFATE 50 MG/ML
5 INJECTION, SOLUTION INTRAMUSCULAR; INTRAVENOUS; SUBCUTANEOUS
Status: DISCONTINUED | OUTPATIENT
Start: 2021-04-16 | End: 2021-04-16

## 2021-04-16 RX ORDER — ACETAMINOPHEN 325 MG/1
975 TABLET ORAL ONCE
Status: COMPLETED | OUTPATIENT
Start: 2021-04-16 | End: 2021-04-16

## 2021-04-16 RX ORDER — FAMOTIDINE 10 MG
10 TABLET ORAL DAILY PRN
COMMUNITY

## 2021-04-16 RX ORDER — NALBUPHINE HYDROCHLORIDE 10 MG/ML
2.5-5 INJECTION, SOLUTION INTRAMUSCULAR; INTRAVENOUS; SUBCUTANEOUS EVERY 6 HOURS PRN
Status: DISCONTINUED | OUTPATIENT
Start: 2021-04-16 | End: 2021-04-18 | Stop reason: HOSPADM

## 2021-04-16 RX ORDER — AMOXICILLIN 250 MG
2 CAPSULE ORAL 2 TIMES DAILY
Status: DISCONTINUED | OUTPATIENT
Start: 2021-04-16 | End: 2021-04-18 | Stop reason: HOSPADM

## 2021-04-16 RX ORDER — OXYTOCIN 10 [USP'U]/ML
10 INJECTION, SOLUTION INTRAMUSCULAR; INTRAVENOUS
Status: DISCONTINUED | OUTPATIENT
Start: 2021-04-16 | End: 2021-04-18 | Stop reason: HOSPADM

## 2021-04-16 RX ORDER — NALOXONE HYDROCHLORIDE 0.4 MG/ML
0.2 INJECTION, SOLUTION INTRAMUSCULAR; INTRAVENOUS; SUBCUTANEOUS
Status: DISCONTINUED | OUTPATIENT
Start: 2021-04-16 | End: 2021-04-16

## 2021-04-16 RX ORDER — LIDOCAINE 40 MG/G
CREAM TOPICAL
Status: DISCONTINUED | OUTPATIENT
Start: 2021-04-16 | End: 2021-04-18 | Stop reason: HOSPADM

## 2021-04-16 RX ORDER — AMOXICILLIN 250 MG
1 CAPSULE ORAL 2 TIMES DAILY
Status: DISCONTINUED | OUTPATIENT
Start: 2021-04-16 | End: 2021-04-18 | Stop reason: HOSPADM

## 2021-04-16 RX ORDER — TRANEXAMIC ACID 10 MG/ML
1 INJECTION, SOLUTION INTRAVENOUS EVERY 30 MIN PRN
Status: DISCONTINUED | OUTPATIENT
Start: 2021-04-16 | End: 2021-04-18 | Stop reason: HOSPADM

## 2021-04-16 RX ORDER — OXYTOCIN/0.9 % SODIUM CHLORIDE 30/500 ML
340 PLASTIC BAG, INJECTION (ML) INTRAVENOUS CONTINUOUS PRN
Status: DISCONTINUED | OUTPATIENT
Start: 2021-04-16 | End: 2021-04-18 | Stop reason: HOSPADM

## 2021-04-16 RX ORDER — MORPHINE SULFATE 1 MG/ML
INJECTION, SOLUTION EPIDURAL; INTRATHECAL; INTRAVENOUS PRN
Status: DISCONTINUED | OUTPATIENT
Start: 2021-04-16 | End: 2021-04-16

## 2021-04-16 RX ORDER — ONDANSETRON 4 MG/1
4 TABLET, ORALLY DISINTEGRATING ORAL EVERY 6 HOURS PRN
Status: DISCONTINUED | OUTPATIENT
Start: 2021-04-16 | End: 2021-04-16

## 2021-04-16 RX ORDER — MODIFIED LANOLIN
OINTMENT (GRAM) TOPICAL
Status: DISCONTINUED | OUTPATIENT
Start: 2021-04-16 | End: 2021-04-18 | Stop reason: HOSPADM

## 2021-04-16 RX ORDER — CEFAZOLIN SODIUM 2 G/100ML
2 INJECTION, SOLUTION INTRAVENOUS
Status: COMPLETED | OUTPATIENT
Start: 2021-04-16 | End: 2021-04-16

## 2021-04-16 RX ORDER — EPHEDRINE SULFATE 50 MG/ML
5 INJECTION, SOLUTION INTRAMUSCULAR; INTRAVENOUS; SUBCUTANEOUS
Status: DISCONTINUED | OUTPATIENT
Start: 2021-04-16 | End: 2021-04-18 | Stop reason: HOSPADM

## 2021-04-16 RX ORDER — CITRIC ACID/SODIUM CITRATE 334-500MG
30 SOLUTION, ORAL ORAL ONCE
Status: DISCONTINUED | OUTPATIENT
Start: 2021-04-16 | End: 2021-04-18 | Stop reason: HOSPADM

## 2021-04-16 RX ORDER — OXYCODONE HYDROCHLORIDE 5 MG/1
5 TABLET ORAL EVERY 4 HOURS PRN
Status: DISCONTINUED | OUTPATIENT
Start: 2021-04-16 | End: 2021-04-18 | Stop reason: HOSPADM

## 2021-04-16 RX ORDER — NALBUPHINE HYDROCHLORIDE 10 MG/ML
2.5-5 INJECTION, SOLUTION INTRAMUSCULAR; INTRAVENOUS; SUBCUTANEOUS EVERY 6 HOURS PRN
Status: DISCONTINUED | OUTPATIENT
Start: 2021-04-16 | End: 2021-04-16

## 2021-04-16 RX ORDER — MISOPROSTOL 200 UG/1
800 TABLET ORAL
Status: DISCONTINUED | OUTPATIENT
Start: 2021-04-16 | End: 2021-04-18 | Stop reason: HOSPADM

## 2021-04-16 RX ORDER — ACETAMINOPHEN 325 MG/1
975 TABLET ORAL EVERY 6 HOURS
Status: DISCONTINUED | OUTPATIENT
Start: 2021-04-16 | End: 2021-04-18 | Stop reason: HOSPADM

## 2021-04-16 RX ORDER — OXYTOCIN/0.9 % SODIUM CHLORIDE 30/500 ML
100 PLASTIC BAG, INJECTION (ML) INTRAVENOUS CONTINUOUS
Status: DISCONTINUED | OUTPATIENT
Start: 2021-04-16 | End: 2021-04-18 | Stop reason: HOSPADM

## 2021-04-16 RX ORDER — ONDANSETRON 2 MG/ML
4 INJECTION INTRAMUSCULAR; INTRAVENOUS EVERY 6 HOURS PRN
Status: DISCONTINUED | OUTPATIENT
Start: 2021-04-16 | End: 2021-04-16

## 2021-04-16 RX ORDER — KETOROLAC TROMETHAMINE 30 MG/ML
INJECTION, SOLUTION INTRAMUSCULAR; INTRAVENOUS PRN
Status: DISCONTINUED | OUTPATIENT
Start: 2021-04-16 | End: 2021-04-16

## 2021-04-16 RX ORDER — ONDANSETRON 4 MG/1
4 TABLET, ORALLY DISINTEGRATING ORAL EVERY 6 HOURS PRN
Status: DISCONTINUED | OUTPATIENT
Start: 2021-04-16 | End: 2021-04-18 | Stop reason: HOSPADM

## 2021-04-16 RX ORDER — SIMETHICONE 80 MG
80 TABLET,CHEWABLE ORAL 4 TIMES DAILY PRN
Status: DISCONTINUED | OUTPATIENT
Start: 2021-04-16 | End: 2021-04-18 | Stop reason: HOSPADM

## 2021-04-16 RX ORDER — BUPIVACAINE HYDROCHLORIDE 7.5 MG/ML
INJECTION, SOLUTION INTRASPINAL PRN
Status: DISCONTINUED | OUTPATIENT
Start: 2021-04-16 | End: 2021-04-16

## 2021-04-16 RX ORDER — FENTANYL CITRATE 50 UG/ML
INJECTION, SOLUTION INTRAMUSCULAR; INTRAVENOUS PRN
Status: DISCONTINUED | OUTPATIENT
Start: 2021-04-16 | End: 2021-04-16

## 2021-04-16 RX ORDER — NALOXONE HYDROCHLORIDE 0.4 MG/ML
0.4 INJECTION, SOLUTION INTRAMUSCULAR; INTRAVENOUS; SUBCUTANEOUS
Status: DISCONTINUED | OUTPATIENT
Start: 2021-04-16 | End: 2021-04-16

## 2021-04-16 RX ORDER — BISACODYL 10 MG
10 SUPPOSITORY, RECTAL RECTAL DAILY PRN
Status: DISCONTINUED | OUTPATIENT
Start: 2021-04-18 | End: 2021-04-18 | Stop reason: HOSPADM

## 2021-04-16 RX ORDER — OXYTOCIN/0.9 % SODIUM CHLORIDE 30/500 ML
PLASTIC BAG, INJECTION (ML) INTRAVENOUS CONTINUOUS PRN
Status: DISCONTINUED | OUTPATIENT
Start: 2021-04-16 | End: 2021-04-16

## 2021-04-16 RX ORDER — CITRIC ACID/SODIUM CITRATE 334-500MG
30 SOLUTION, ORAL ORAL ONCE
Status: DISCONTINUED | OUTPATIENT
Start: 2021-04-16 | End: 2021-04-16

## 2021-04-16 RX ORDER — ONDANSETRON 2 MG/ML
INJECTION INTRAMUSCULAR; INTRAVENOUS PRN
Status: DISCONTINUED | OUTPATIENT
Start: 2021-04-16 | End: 2021-04-16

## 2021-04-16 RX ADMIN — KETOROLAC TROMETHAMINE 30 MG: 30 INJECTION, SOLUTION INTRAMUSCULAR at 11:57

## 2021-04-16 RX ADMIN — CEFAZOLIN SODIUM 2 G: 2 INJECTION, SOLUTION INTRAVENOUS at 11:22

## 2021-04-16 RX ADMIN — ACETAMINOPHEN 975 MG: 325 TABLET, FILM COATED ORAL at 10:07

## 2021-04-16 RX ADMIN — FENTANYL CITRATE 15 MCG: 50 INJECTION, SOLUTION INTRAMUSCULAR; INTRAVENOUS at 11:16

## 2021-04-16 RX ADMIN — ONDANSETRON 4 MG: 2 INJECTION INTRAMUSCULAR; INTRAVENOUS at 11:33

## 2021-04-16 RX ADMIN — BUPIVACAINE HYDROCHLORIDE IN DEXTROSE 10.5 MG: 7.5 INJECTION, SOLUTION SUBARACHNOID at 11:16

## 2021-04-16 RX ADMIN — Medication 340 ML/HR: at 11:38

## 2021-04-16 RX ADMIN — Medication 100 ML/HR: at 15:43

## 2021-04-16 RX ADMIN — KETOROLAC TROMETHAMINE 30 MG: 30 INJECTION, SOLUTION INTRAMUSCULAR at 18:35

## 2021-04-16 RX ADMIN — ACETAMINOPHEN 975 MG: 325 TABLET, FILM COATED ORAL at 21:59

## 2021-04-16 RX ADMIN — SODIUM CITRATE AND CITRIC ACID MONOHYDRATE 30 ML: 500; 334 SOLUTION ORAL at 10:27

## 2021-04-16 RX ADMIN — SODIUM CHLORIDE, SODIUM LACTATE, POTASSIUM CHLORIDE, CALCIUM CHLORIDE AND DEXTROSE MONOHYDRATE: 5; 600; 310; 30; 20 INJECTION, SOLUTION INTRAVENOUS at 22:01

## 2021-04-16 RX ADMIN — SODIUM CHLORIDE, POTASSIUM CHLORIDE, SODIUM LACTATE AND CALCIUM CHLORIDE: 600; 310; 30; 20 INJECTION, SOLUTION INTRAVENOUS at 10:06

## 2021-04-16 RX ADMIN — MORPHINE SULFATE 0.15 MG: 1 INJECTION, SOLUTION EPIDURAL; INTRATHECAL; INTRAVENOUS at 11:16

## 2021-04-16 RX ADMIN — SENNOSIDES AND DOCUSATE SODIUM 2 TABLET: 8.6; 5 TABLET ORAL at 21:59

## 2021-04-16 RX ADMIN — PHENYLEPHRINE HYDROCHLORIDE 0.5 MCG/KG/MIN: 10 INJECTION INTRAVENOUS at 11:16

## 2021-04-16 RX ADMIN — ACETAMINOPHEN 975 MG: 325 TABLET, FILM COATED ORAL at 16:10

## 2021-04-16 ASSESSMENT — MIFFLIN-ST. JEOR: SCORE: 1660.69

## 2021-04-16 NOTE — ANESTHESIA PROCEDURE NOTES
Intrathecal Procedure Note  Pre-Procedure   Staff -        Anesthesiologist:  Jose L Leggett MD       Performed By: Anesthesiologist       Location: OB       Pre-Anesthestic Checklist: patient identified, IV checked, site marked, risks and benefits discussed, informed consent, monitors and equipment checked, pre-op evaluation, at physician/surgeon's request and post-op pain management  Timeout:       Correct Patient: Yes        Correct Procedure: Yes        Correct Site: Yes        Correct Position: Yes   Procedure Documentation  Procedure: intrathecal       Patient Position: sitting       Skin prep: Betadine       Insertion Site: L3-4. (midline approach).       Spinal Needle Type: Lynnette tip       Introducer used       Introducer: 20 G      # of attempts: 1 and  # of redirects:     Assessment/Narrative         Paresthesias: No.       CSF fluid: clear.      Opening pressure was cmH2O while  Sitting.      Comments:  Patient sitting on edge of OR bed, lower back cleaned and prepped in sterile fashion with betadine. 1% lido used to numb area. Introducer placed, spinal needle through introducer. Appropriate flow of CSF and confirmed with aspiration via syringe. Spinal dose given, 10.5 mg 0.75% bupivacaine with dextrose, 150mcg morphine, 15mcg fentanyl. No complications.

## 2021-04-16 NOTE — ANESTHESIA PREPROCEDURE EVALUATION
Anesthesia Pre-Procedure Evaluation    Patient: Sheila Calloway   MRN: [id number] : 1990        Preoperative Diagnosis: Third degree perineal laceration [O70.20]   Procedure : Procedure(s):  PRIMARY  SECTION     History reviewed. No pertinent past medical history.   Past Surgical History:   Procedure Laterality Date     D & C      times 3      DILATION AND CURETTAGE SUCTION N/A 2017    Procedure: DILATION AND CURETTAGE SUCTION;  SUCTION DILATION AND CURETTAGE ;  Surgeon: Paris Garber MD;  Location:  SD     DILATION AND CURETTAGE SUCTION N/A 2020    Procedure: SUCTION DILATION AND CURETTAGE;  Surgeon: Paris Garber MD;  Location:  OR      TOOTH EXTRACTION W/FORCEP      wisdom teeth extraction      No Known Allergies   Social History     Tobacco Use     Smoking status: Never Smoker     Smokeless tobacco: Never Used   Substance Use Topics     Alcohol use: No     Frequency: Never     Comment: 2/week; nothing during pregnancy      Wt Readings from Last 1 Encounters:   21 90.7 kg (200 lb)        Anesthesia Evaluation   Pt has had prior anesthetic.     No history of anesthetic complications       ROS/MED HX  ENT/Pulmonary:  - neg pulmonary ROS     Neurologic:  - neg neurologic ROS     Cardiovascular:  - neg cardiovascular ROS     METS/Exercise Tolerance: >4 METS    Hematologic:  - neg hematologic  ROS     Musculoskeletal:  - neg musculoskeletal ROS     GI/Hepatic:  - neg GI/hepatic ROS     Renal/Genitourinary:  - neg Renal ROS     Endo:  - neg endo ROS     Psychiatric/Substance Use:  - neg psychiatric ROS     Infectious Disease:  - neg infectious disease ROS     Malignancy:  - neg malignancy ROS     Other:  - neg other ROS   (-) previous        Physical Exam    Airway        Mallampati: II   TM distance: > 3 FB   Neck ROM: full   Mouth opening: > 3 cm    Respiratory Devices and Support         Dental  no notable dental history         Cardiovascular    cardiovascular exam normal          Pulmonary   pulmonary exam normal                OUTSIDE LABS:  CBC:   Lab Results   Component Value Date    WBC 7.7 04/15/2021    WBC 9.9 12/14/2018    HGB 12.0 04/15/2021    HGB 10.1 (L) 12/16/2018    HCT 35.2 04/15/2021    HCT 35.4 12/14/2018     04/15/2021     12/14/2018     BMP:   Lab Results   Component Value Date    BUN 10 12/14/2018    CR 0.69 12/14/2018     COAGS: No results found for: PTT, INR, FIBR  POC: No results found for: BGM, HCG, HCGS  HEPATIC:   Lab Results   Component Value Date    ALT 16 12/14/2018    AST 14 12/14/2018     OTHER:   Lab Results   Component Value Date    TSH 3.900 05/01/2020       Anesthesia Plan    ASA Status:  2      Anesthesia Type: Spinal.              Consents    Anesthesia Plan(s) and associated risks, benefits, and realistic alternatives discussed. Questions answered and patient/representative(s) expressed understanding.     - Discussed with:  Patient         Postoperative Care            Comments:                Jose L Leggett MD

## 2021-04-16 NOTE — ANESTHESIA CARE TRANSFER NOTE
Patient: Sheila Calloway    Procedure(s):  PRIMARY  SECTION    Diagnosis: Third degree perineal laceration [O70.20]  Diagnosis Additional Information: No value filed.    Anesthesia Type:   Spinal     Note:    Oropharynx: oropharynx clear of all foreign objects and spontaneously breathing  Level of Consciousness: awake  Oxygen Supplementation: room air    Independent Airway: airway patency satisfactory and stable  Dentition: dentition unchanged  Vital Signs Stable: post-procedure vital signs reviewed and stable  Report to RN Given: handoff report given  Patient transferred to: Phase II  Comments: Transferred to OB PACU recovery, spontaneous respirations on room air with oxygen saturations maintained greater than 98%. SpO2, NiBP, and EKG monitors and alarms on and functioning, report on patient's clinical status given to OB recovery RN, RN questions answered, patient in hospital bed with siderails up, Aries Hugger warmer connected to patient gown Oxytocin 30 units in 500mL infusion connected to IV infusion pump in recovery bay and programmed to 100 mL/hr at handoff of care.    Handoff Report: Identifed the Patient, Identified the Reponsible Provider, Reviewed the pertinent medical history, Discussed the surgical course, Reviewed Intra-OP anesthesia mangement and issues during anesthesia, Set expectations for post-procedure period and Allowed opportunity for questions and acknowledgement of understanding      Vitals: (Last set prior to Anesthesia Care Transfer)    116/79  HR 70  RR 16  SPO2 99% on RA    CRNA VITALS  2021 1151 - 2021 1227      2021             Resp Rate (set):  10        Electronically Signed By: SANDRA Bates CRNA  2021  12:27 PM

## 2021-04-16 NOTE — H&P
[facility] Labor and Delivery History and Physical    Sheila Calloway MRN# [id number]   Age: 31 year old YOB: 1990     Date of Admission:  2021    Primary care provider: Paris Garber  Primary clinic: Obstetrics, Gynecology, and Infertility           HPI:   Sheila Calloawy is a 31 year old  at 39w1d by LMP consistent with early US admitted for scheduled  indicated by prior pelvic trauma with delivery.  She has history of recurrent pregnancy loss.           Pregnancy history:     OBSTETRIC HISTORY:  OB History    Para Term  AB Living   5 1 1 0 0 1   SAB TAB Ectopic Multiple Live Births   0 0 0 0 1      # Outcome Date GA Lbr Lenny/2nd Weight Sex Delivery Anes PTL Lv   5 Current            4 Term 12/15/18 41w0d 07:00 / 03:08 3.72 kg (8 lb 3.2 oz) M Vag-Vacuum EPI N FRANCIS      Name: ANA CALLOWAY      Apgar1: 8  Apgar5: 9   3             2             1                 EDC: Estimated Date of Delivery: 2021    Complications:   Patient Active Problem List   Diagnosis     Pregnancy, supervision, normal, first     VAVD   Recurrent pregnancy loss    Prenatal Labs:   Lab Results   Component Value Date    ABO O 04/15/2021    RH Pos 04/15/2021    AS Neg 04/15/2021    HEPBANG negative 2018    CHPCRT Negative 2020    GCPCRT Negative 2020    RUBELLAABIGG 3.95 2018    HGB 12.0 04/15/2021       GBS Status:   Lab Results   Component Value Date    GBS Negative 2021       Ultrasounds:  Normal anatomy screen, no genetic testing        Maternal Past Medical History:   History reviewed. No pertinent past medical history.  Past Surgical History:   Procedure Laterality Date     D & C      times 3      DILATION AND CURETTAGE SUCTION N/A 2017    Procedure: DILATION AND CURETTAGE SUCTION;  SUCTION DILATION AND CURETTAGE ;  Surgeon: Paris Garber MD;  Location: [facility]     DILATION AND CURETTAGE  "SUCTION N/A 2020    Procedure: SUCTION DILATION AND CURETTAGE;  Surgeon: Paris Garber MD;  Location:  OR      TOOTH EXTRACTION W/FORCEP      wisdom teeth extraction      Medications Prior to Admission   Medication Sig Dispense Refill Last Dose     famotidine (PEPCID) 10 MG tablet Take 10 mg by mouth daily as needed   4/15/2021 at Unknown time     Prenatal Vit w/Ri-Wefemxnwl-NT (PNV PO) Take 1 tablet by mouth daily   4/15/2021 at Unknown time     ibuprofen (ADVIL/MOTRIN) 600 MG tablet Take 1 tablet (600 mg) by mouth every 6 hours as needed for other (mild and/or inflammatory pain) 30 tablet 0            Family History:   The family history is not on file.          Social History:     Social History     Tobacco Use     Smoking status: Never Smoker     Smokeless tobacco: Never Used   Substance Use Topics     Alcohol use: No     Frequency: Never     Comment: 2/week; nothing during pregnancy            Review of Systems:   The Review of Systems is negative other than noted in the HPI          Physical Exam:     Patient Vitals for the past 8 hrs:   BP Temp Temp src Resp Height Weight   21 0926 -- -- -- -- 1.727 m (5' 8\") 90.7 kg (200 lb)   21 0913 123/81 98.9  F (37.2  C) Temporal 18 -- --     Gen: Awake and alert, no distress  CV: WWP  Resp: Nonlabored breathing  Abd: Gravid c/w gest age  Ext: Nontender no edema    Cervix: 1 and long  Membranes: intact  EFW: 7.5 lb  Presentation:Cephalic    NST  Fetal Heart Rate Tracing:   Baseline 135  Variability: moderate  Accelerations: Present  Decelerations: Variable decelerations  Interpretation: reactive    Contractions: occasional        Assessment:   Sheila Calloway is a 31 year old  at 39w1d admitted for scheduled  for prior pelvic trauma.  Recurrent pregnancy loss        Plan:   1. Consent obtained  2. Fetal wellbeing: Category I  3. To OR    Paris Garber MD   Obstetrics, Gynecology, and Infertility    "

## 2021-04-16 NOTE — PLAN OF CARE
905.  Pt admitted to room 235 for a scheduled  with  at 1100 today.   Monitors applied with pts consent.   Health history obtained.

## 2021-04-16 NOTE — ANESTHESIA POSTPROCEDURE EVALUATION
Patient: Sheila Calloway    Procedure(s):  PRIMARY  SECTION    Diagnosis:Third degree perineal laceration [O70.20]  Diagnosis Additional Information: No value filed.    Anesthesia Type:  Spinal    Note:  Disposition: Inpatient   Postop Pain Control: Uneventful            Sign Out: Well controlled pain   PONV: No   Neuro/Psych: Uneventful            Sign Out: Acceptable/Baseline neuro status   Airway/Respiratory: Uneventful            Sign Out: Acceptable/Baseline resp. status   CV/Hemodynamics: Uneventful            Sign Out: Acceptable CV status   Other NRE: NONE   DID A NON-ROUTINE EVENT OCCUR? No    Event details/Postop Comments:  Spinal regressing appropriately. Pain well controlled.         Last vitals:  Vitals:    21 1410 21 1440 21 1510   BP:  113/67 112/69   Pulse: 75 72 71   Resp:  16   Temp:  37  C (98.6  F) 36.8  C (98.3  F)   SpO2:          Last vitals prior to Anesthesia Care Transfer:  CRNA VITALS  2021 1151 - 2021 1251      2021             Resp Rate (set):  10          Electronically Signed By: Jose L Leggett MD  2021  4:04 PM

## 2021-04-16 NOTE — PLAN OF CARE
Data: Sheila Calloway transferred to 431 via bed at 1420. Baby transferred via parent's arms.  Action: Receiving unit notified of transfer: Yes. Patient and family notified of room change. Report given to Mary Avila RN at 1425. Belongings sent to receiving unit. Accompanied by Registered Nurse. Oriented patient to surroundings. Call light within reach. ID bands double-checked with receiving RN.  Response: Patient tolerated transfer and is stable.

## 2021-04-16 NOTE — PLAN OF CARE
Vital signs stable. Postpartum assessment WDL. Incision dressing clean, dry, and intact. Pain controlled with tylenol, toradol, and duramorph. Assisted Patient up. Patient denied feeling any dizziness when up. Patient dangled at bedside. Patient stood up at bedside. Kacey cares performed in bed. Breastfeeding is going ok with nipple shield. Patient and infant bonding well. Will continue with current plan of care.

## 2021-04-16 NOTE — PLAN OF CARE
Pt. admitted from L&D via bed.. Pt. arrived with baby and was accompanied by , nurse, and arrived with personal belongings. Report was taken from Valentine Enciso RN in L&D.  Pt. is A&Ox3 and VSS on RA. Fundus is firm and midline.  Vaginal bleeding is light.   Pt. c/o 0/10 pain. Pt. denied feeling nausea, CP, SOB, lightheadedness, or dizziness. LS clear and BS hypoactive. PIV patent and infusing.  Anaya patent and draining.  Dressing to lower abdomen CDI.  Pneumoboots in place to BLE.  Pt. oriented to the room and call light system.

## 2021-04-16 NOTE — L&D DELIVERY NOTE
Section Operative Note  Date: 2021    Patient: Sheila Calloway   Surgeon: Paris Garber MD  Assistant: Brissa Elkins CST  Pre-Op Diagnosis:    1.  at 39w1d   2. Prior pelvic trauma  Post-Op Diagnosis:    1.   delivery at 39w1d    2.  As above   Procedure: Primary low transverse  section via pfannenstiel skin incision with two layer uterine closure.   Anesthesia: Spinal  QBL: 745cc            Complications: None apparent  Specimens: None  Indications: 31 year old admitted as a  at 39w1d for scheduled  delivery.  Her first delivery was complicated by vacuum extraction and third degree laceration.  The patient was counseled on risks, benefits and alternatives to  delivery. Written informed consent was obtained.   Findings: Live-born female infant from the cephalic presentation born on 2023 at 1136, apgars 8 & 9, weight 7 lb 14 oz. Clear amniotic fluid.  No nuchal cord.  Placenta intact with a 3V cord. Normal uterus, tubes and ovaries.   Procedure: The patient was taken to the operating room where spinal anesthesia was initiated.  She was placed in the dorsal supine position with a leftward tilt. A navarro catheter was placed. She was then prepped and draped in the usual sterile fashion and a time out was performed. A pfannenstiel skin incision was made and carried down to the underlying fascia. The fascial incision was extended laterally. The fascia was grasped and elevated, and the superior and inferior aspects were dissected away from the rectus muscles with blunt and sharp dissection.  The rectus muscles were then  at the midline and the peritoneum was entered sharply. The peritoneal incision was extended.  The fetal lie was palpated. The vesicouterine peritoneum was grasped, tented, and incised. A low transverse uterine incision was then made with the scalpel and the incision was extended with cephalocaudad finger fraction. The bladder blade  was removed and the infant was delivered atraumatically. The cord was clamped and cut and the infant was handed off to the waiting team. The placenta was removed with gentle traction on the cord. The uterus was unable to be exteriorized. It was cleared of all clots and debris in place and an freda retractor was placed. The hysterotomy was then closed with 0-Vicryl in a running fashion. A second imbricating layer was then placed using 0-Monocryl. One additional figure of X suture was placed on the left corner to obtain hemostasis. The hysterotomy was noted to be hemostatic. The pericolic gutters were cleared.   The peritoneum was closed with 3-0 vicryl. The fascia and rectus muscles were inspected and noted to be hemostatic.  The rectus muscles were reapproximated with 0 vicryl in interrupted sutures. The fascial incision was then closed with 0-Vicryl in a running fashion. The subcutaneous tissues were irrigated and hemostasis achieved with the Bovie cautery.  The subcutaneous tissues were reapproximated with 3-0 chromic.  The skin was then closed with 4-0 monocryl in a running subcuticular fashion.     All instrument, lap and needle counts were correct x 2. Ancef was given prior to skin incision. The patient tolerated the procedure well and was taken to the PACU in stable condition.     Paris Garber MD  Obstetrics, Gynecology, and Infertility

## 2021-04-17 LAB — HGB BLD-MCNC: 10.2 G/DL (ref 11.7–15.7)

## 2021-04-17 PROCEDURE — 120N000012 HC R&B POSTPARTUM

## 2021-04-17 PROCEDURE — 85018 HEMOGLOBIN: CPT | Performed by: OBSTETRICS & GYNECOLOGY

## 2021-04-17 PROCEDURE — 250N000011 HC RX IP 250 OP 636: Performed by: OBSTETRICS & GYNECOLOGY

## 2021-04-17 PROCEDURE — 36415 COLL VENOUS BLD VENIPUNCTURE: CPT | Performed by: OBSTETRICS & GYNECOLOGY

## 2021-04-17 PROCEDURE — 250N000013 HC RX MED GY IP 250 OP 250 PS 637: Performed by: OBSTETRICS & GYNECOLOGY

## 2021-04-17 RX ADMIN — SIMETHICONE 80 MG: 80 TABLET, CHEWABLE ORAL at 21:19

## 2021-04-17 RX ADMIN — OXYCODONE HYDROCHLORIDE 5 MG: 5 TABLET ORAL at 11:08

## 2021-04-17 RX ADMIN — ACETAMINOPHEN 975 MG: 325 TABLET, FILM COATED ORAL at 15:52

## 2021-04-17 RX ADMIN — ACETAMINOPHEN 975 MG: 325 TABLET, FILM COATED ORAL at 03:32

## 2021-04-17 RX ADMIN — SENNOSIDES AND DOCUSATE SODIUM 2 TABLET: 8.6; 5 TABLET ORAL at 21:20

## 2021-04-17 RX ADMIN — ACETAMINOPHEN 975 MG: 325 TABLET, FILM COATED ORAL at 22:09

## 2021-04-17 RX ADMIN — SIMETHICONE 80 MG: 80 TABLET, CHEWABLE ORAL at 09:44

## 2021-04-17 RX ADMIN — SENNOSIDES AND DOCUSATE SODIUM 1 TABLET: 8.6; 5 TABLET ORAL at 08:43

## 2021-04-17 RX ADMIN — KETOROLAC TROMETHAMINE 30 MG: 30 INJECTION, SOLUTION INTRAMUSCULAR at 06:20

## 2021-04-17 RX ADMIN — IBUPROFEN 800 MG: 400 TABLET ORAL at 12:15

## 2021-04-17 RX ADMIN — SIMETHICONE 80 MG: 80 TABLET, CHEWABLE ORAL at 15:52

## 2021-04-17 RX ADMIN — IBUPROFEN 800 MG: 400 TABLET ORAL at 18:26

## 2021-04-17 RX ADMIN — KETOROLAC TROMETHAMINE 30 MG: 30 INJECTION, SOLUTION INTRAMUSCULAR at 00:02

## 2021-04-17 RX ADMIN — ACETAMINOPHEN 975 MG: 325 TABLET, FILM COATED ORAL at 10:08

## 2021-04-17 NOTE — LACTATION NOTE
Routine visit with WILLY Sosa and baby girl.  Breastfeeding general information reviewed.   Advised to breastfeed on demand 8-12x/day or sooner if baby cues.  Offered to assit with breast feeding on the right breast as that is the nipple that bled last night.  Mother reports she is using lanolin , has not yet used the hydrogel.    Explained benefits of holding and skin to skin.  Encouraged lots of skin to skin and to preform  hand expression prior to feeds.     Continues to nurse well per mom. No further questions at this time.   Will follow as needed.   Salena Hillman BSN, RN, PHN, RNC-MNN, IBCLC

## 2021-04-17 NOTE — LACTATION NOTE
Baby latched to the right breast with shield and mother in pain.  Lips flanged and after 5 minutes nipple appears white,  Baby moved to the left breast. Plan is to pump on the right for the next two feeds and then attempt to feed on the right every 3 rd  Feeding.   Hydrogel applied on the right.  Baby nursed for a total of 30 minutes.  No further questions at this time. Will follow as needed. Salena Ochoa-Iris BSN, RN, PHN, RNC-MNN, IBCLC

## 2021-04-17 NOTE — LACTATION NOTE
Routine visit with Sheila and alex Del Toro.Infant awake and eager to feed. Latches to right side using nipple shield; using shield d/t flat nipples. Comfortable using football hold. Infant with vigorous suckle and deep latch on shield; colostrum noted in shield. Sheila with tender, reddened right nipple; she's been pumping on the right side. D/t history of poor feeding and lower milk supply with first child, encourage pumping bilaterally and utilizing hand expression after feeding sessions.    IFsupplementation is recommended during first few days, discuss supplementating AT the breast.    Sheila and s/o very appreciative of visit.    Alysa Elaine, RN, IBCLC

## 2021-04-17 NOTE — PLAN OF CARE
Vss, RA.  LS clear. BS present.  UO adequate.  IV SL.  Breastfeeding on L side only. Pumping on right due to bleeding/cracked nipple.  Tylenol and toradol controlling pain well.  Incision WDL, UTV, no drainage.   bedside and supportive of pt.

## 2021-04-17 NOTE — PLAN OF CARE
Vital signs stable. Postpartum assessment WDL. Incision clean, dry, and intact. Pain controlled with tylenol, ibuprofen, and oxycodone. Patient ambulating independently in room. Patient passing gas. Patient c/o shoulder pain. Heat applied. Breastfeeding is going ok. Patient only breastfeed baby on the left breast because right nipple was bleeding. Patient pumped on both sides after breastfeeding. Patient and infant bonding well. Will continue with current plan of care.

## 2021-04-17 NOTE — PROGRESS NOTES
"Progress Note:  Delivery    SUBJECTIVE  POD#1, s/p PLTCS at 39+1 weeks gestation.  Doing well this morning.  She has ambulated minimally.  Urinary catheter was just removed and she has not yet urinated.  Pain is well controlled with oral pain meds.  She is tolerating a general diet without problem.  Baby is in the room with her.  She is breast feeding.  Lochia is minimal.  No other concerns or questions.    OBJECTIVE  /77   Pulse 81   Temp 98.2  F (36.8  C) (Oral)   Resp 16   Ht 1.727 m (5' 8\")   Wt 90.7 kg (200 lb)   SpO2 100%   Breastfeeding Unknown   BMI 30.41 kg/m    General: Alert and pleasant  Abd: Soft, nontender, fundus firm below the umbilicus  Incision: Bandage c/d/i  Ext: No significant edema or calf tenderness    Recent Results (from the past 2016 hour(s))   Group B strep PCR    Collection Time: 21 12:00 AM   Result Value Ref Range    Group B Strep PCR Negative    Asymptomatic COVID-19 Virus (Coronavirus) by PCR    Collection Time: 21 10:40 AM    Specimen: Nasopharyngeal   Result Value Ref Range    COVID-19 Virus PCR to [facility] - Source Nasopharyngeal     COVID-19 Virus PCR to [facility] - Result       Test received-See reflex to IDDL test SARS CoV2 (COVID-19) Virus RT-PCR   SARS-CoV-2 COVID-19 Virus (Coronavirus) by PCR    Collection Time: 21 10:40 AM    Specimen: Nasopharyngeal   Result Value Ref Range    SARS-CoV-2 Virus Specimen Source Nasopharyngeal     SARS-CoV-2 PCR Result NEGATIVE     SARS-CoV-2 PCR Comment       Testing was performed using the Aptima SARS-CoV-2 Assay on the [employer] Instrument System.   Additional information about this Emergency Use Authorization (EUA) assay can be found via   the Lab Guide.     ABO/Rh type and screen    Collection Time: 04/15/21  9:05 AM   Result Value Ref Range    ABO O     RH(D) Pos     Antibody Screen Neg     Test Valid Only At [facility]        Specimen Expires 2021    CBC with platelets    " Collection Time: 04/15/21  9:05 AM   Result Value Ref Range    WBC 7.7 4.0 - 11.0 10e9/L    RBC Count 4.01 3.8 - 5.2 10e12/L    Hemoglobin 12.0 11.7 - 15.7 g/dL    Hematocrit 35.2 35.0 - 47.0 %    MCV 88 78 - 100 fl    MCH 29.9 26.5 - 33.0 pg    MCHC 34.1 31.5 - 36.5 g/dL    RDW 12.2 10.0 - 15.0 %    Platelet Count 153 150 - 450 10e9/L   Treponema Abs w Reflex to RPR and Titer    Collection Time: 04/15/21  9:05 AM   Result Value Ref Range    Treponema Antibodies Nonreactive NR^Nonreactive   Hemoglobin    Collection Time: 21  5:54 AM   Result Value Ref Range    Hemoglobin 10.2 (L) 11.7 - 15.7 g/dL   ]    ASSESSMENT  Sheila Calloway is a 31 year old  POD# 1 s/p LTCS at 39+1 weeks gestation.  Hemoglobin this morning is 10.2.      PLAN  1) Routine post-partum/post-operative cares.  Encourage ambulation.  Await spontaneous void.  Bandage can be removed early this afternoon after showering.  2) Rh+.  Rhogam not indicated  3) Acute blood loss anemia with Hgb 10.2 postoperatively.    4) Pertussis vaccine to be given if indicated  5) Anticipate discharge likely tomorrow.  Due to being called for a delivery at another hospital, orders and Rx's were NOT done and will need to be done tomorrow prior to discharge.      Maci Austin MD  Obstetrics, Gynecology and Infertility

## 2021-04-17 NOTE — PLAN OF CARE
Assisted Patient up to bathroom. Pt denied feeling any dizziness when up. Pt voided 1000 ml. Pt is taking a shower right at this moment. Continues to monitor Pt.

## 2021-04-17 NOTE — LACTATION NOTE
Initial visit with Sheila, FOB, and infant. Breastfeeding has been going well per mom. Breastfeeding did not go well with the first child due to lip and tongue tie, and mom ended up pumping and bottle feeding. Sheila does not have a specific plan for feeding, but just wants to do what works for her and the baby.    Reviewed general breastfeeding information. Discussed expected feeding pattern the next 24-48 hrs.     Advised to breastfeed exclusively, on demand, avoid pacifiers, bottles and formula unless medically indicated. Encouraged rooming in, skin to skin, feeding on demand 8-12x/day or sooner if baby cues. Explained benefits of holding and skin to skin.  Encouraged lots of skin to skin. Instructed on hand expression.    Sheila has a pump for home. No further questions at this time. Pt very receptive to information and smiling. Thanked writer for help and tips. Will follow as needed.     -Bonifacio Martinez, Lactation Educator

## 2021-04-18 VITALS
SYSTOLIC BLOOD PRESSURE: 115 MMHG | DIASTOLIC BLOOD PRESSURE: 74 MMHG | TEMPERATURE: 97.7 F | RESPIRATION RATE: 16 BRPM | WEIGHT: 200 LBS | HEIGHT: 68 IN | BODY MASS INDEX: 30.31 KG/M2 | HEART RATE: 76 BPM | OXYGEN SATURATION: 100 %

## 2021-04-18 PROCEDURE — 250N000013 HC RX MED GY IP 250 OP 250 PS 637: Performed by: OBSTETRICS & GYNECOLOGY

## 2021-04-18 RX ORDER — IBUPROFEN 600 MG/1
600 TABLET, FILM COATED ORAL EVERY 6 HOURS PRN
Qty: 60 TABLET | Refills: 1 | Status: ON HOLD | OUTPATIENT
Start: 2021-04-18 | End: 2021-04-26

## 2021-04-18 RX ORDER — OXYCODONE HYDROCHLORIDE 5 MG/1
5 TABLET ORAL EVERY 6 HOURS PRN
Qty: 8 TABLET | Refills: 0 | Status: ON HOLD | OUTPATIENT
Start: 2021-04-18 | End: 2021-04-26

## 2021-04-18 RX ADMIN — SIMETHICONE 80 MG: 80 TABLET, CHEWABLE ORAL at 00:49

## 2021-04-18 RX ADMIN — ACETAMINOPHEN 975 MG: 325 TABLET, FILM COATED ORAL at 10:05

## 2021-04-18 RX ADMIN — SENNOSIDES AND DOCUSATE SODIUM 2 TABLET: 8.6; 5 TABLET ORAL at 07:43

## 2021-04-18 RX ADMIN — IBUPROFEN 800 MG: 400 TABLET ORAL at 00:49

## 2021-04-18 RX ADMIN — ACETAMINOPHEN 975 MG: 325 TABLET, FILM COATED ORAL at 04:07

## 2021-04-18 RX ADMIN — IBUPROFEN 800 MG: 400 TABLET ORAL at 06:29

## 2021-04-18 RX ADMIN — SIMETHICONE 80 MG: 80 TABLET, CHEWABLE ORAL at 06:32

## 2021-04-18 NOTE — PLAN OF CARE
Vital signs stable. Postpartum assessment WDL. Steri strips over incision CDI. Pain controlled with tylenol and ibuprofen. C/o gas pain. Encouraged ambulation in room and hallways. Simethicone given PRN. Using abdominal binder. Nipples sore, right one cracked- using hydrogels. Plans to pump PRN. Bottle feeding . Patient and infant bonding well. Plan to discharge today. Will continue with current plan of care.

## 2021-04-18 NOTE — PLAN OF CARE
Vital signs stable. Incision intact with steri strips. Voiding without difficulty. Lung sounds clear and equal. Using tylenol/ibuprofen for pain management. Up ambulating free of dizziness. Patient has decided to stop breastfeeding due to tender bleeding nipples. Using hydrogels/shells. Planning on discharging home today. Discharge instructions/follow up discussed. Questions/concerns addressed.

## 2021-04-18 NOTE — DISCHARGE SUMMARY
[facility] Discharge Summary    Sheila Calloway MRN# [id number]   Age: 31 year old YOB: 1990     Date of Admission:  2021  Date of Discharge::  2021  Admitting Physician:  Paris Garber MD  Discharge Physician:  Paris Garber MD      Home clinic: Obstetrics, Gynecology, and Infertility          Admission Diagnoses:   1. Intrauterine pregnancy at 39w1d   2. Prior pelvic trauma          Discharge Diagnosis:     1.  delivery at 39w1d   2. Lactation difficulty          Procedures:   Primary low transverse  section via pfannenstiel skin incision with two layer uterine closure         Medications Prior to Admission:     Medications Prior to Admission   Medication Sig Dispense Refill Last Dose     famotidine (PEPCID) 10 MG tablet Take 10 mg by mouth daily as needed   4/15/2021 at Unknown time     Prenatal Vit w/Ax-Eznyxbixc-MH (PNV PO) Take 1 tablet by mouth daily   4/15/2021 at Unknown time     [DISCONTINUED] ibuprofen (ADVIL/MOTRIN) 600 MG tablet Take 1 tablet (600 mg) by mouth every 6 hours as needed for other (mild and/or inflammatory pain) 30 tablet 0              Discharge Medications:     Current Discharge Medication List      START taking these medications    Details   oxyCODONE (ROXICODONE) 5 MG tablet Take 1 tablet (5 mg) by mouth every 6 hours as needed for severe pain maximum 6 tablet(s) per day  Qty: 8 tablet, Refills: 0    Associated Diagnoses: S/P  section         CONTINUE these medications which have CHANGED    Details   ibuprofen (ADVIL/MOTRIN) 600 MG tablet Take 1 tablet (600 mg) by mouth every 6 hours as needed for pain  Qty: 60 tablet, Refills: 1    Associated Diagnoses: S/P  section         CONTINUE these medications which have NOT CHANGED    Details   famotidine (PEPCID) 10 MG tablet Take 10 mg by mouth daily as needed      Prenatal Vit w/Qk-Weyfyqqsq-IU (PNV PO) Take 1 tablet by mouth daily                    Brief  History of Labor or Admission:   Sheila Calloway is a 31 year old  who was admitted at 39w1d for scheduled  due to prior pelvic trauma.           Hospital Course:     Information for the patient's :  Irma Calloway-Sheila [[id number]]     YOB: 2023  Time:11:36 AM   Gestational Age: Gestational Age: 39w1d   Infant Weight: 7 lbs 14.28 oz    APGAR 1 minute: 8   APGAR 5 minutes: 9   Delivery QBL (mL): 745 mL  The patient's hospital course was unremarkable.  She recovered as anticipated and experienced no post-operative complications.  On discharge, her pain was well controlled. Vaginal bleeding is similar to peak menstrual flow.  Voiding without difficulty.  Ambulating well and tolerating a normal diet.  No fever or significant wound drainage.  Breastfeeding going poorly, pumping and supplementing with formula.  Infant is stable.  Bowel function returning. She was discharged on post-partum day #2.     Post-partum hemoglobin:   Hemoglobin   Date Value Ref Range Status   2021 10.2 (L) 11.7 - 15.7 g/dL Final             Discharge Instructions and Follow-Up:     Discharge diet: Regular   Discharge activity: No heavy lifting, pushing, pulling for 6 week(s)  No driving or operating machinery while on narcotic analgesics  Pelvic rest: abstain from intercourse and do not use tampons for 6 week(s)   Discharge follow-up: Incision check in 2 weeks  Routine postpartum visit in 8-10 weeks   Wound care: Keep wound clean and dry  May shower but do not soak incision or scrub  Steri-strips off in 7 days             Discharge Disposition:     Discharged to home      MD OMER Shipley

## 2021-04-18 NOTE — PROGRESS NOTES
"Progress Note:  Delivery    SUBJECTIVE  POD#2, s/p PLTCS at 39+1 weeks gestation.  Doing well despite some gas pain and nursing difficulties.  She is now pumping and supplementing with formula. Voiding spontanously, tolerating regular diet, and bleeding is normal.     OBJECTIVE  /74   Pulse 76   Temp 97.7  F (36.5  C) (Oral)   Resp 16   Ht 1.727 m (5' 8\")   Wt 90.7 kg (200 lb)   SpO2 100%   Breastfeeding Unknown   BMI 30.41 kg/m    General: Alert and pleasant  Abd: Soft, nontender, fundus firm below the umbilicus  Incision: C/D/I sutured  Ext: No significant edema or calf tenderness    Recent Results (from the past 2016 hour(s))   Group B strep PCR    Collection Time: 21 12:00 AM   Result Value Ref Range    Group B Strep PCR Negative    Asymptomatic COVID-19 Virus (Coronavirus) by PCR    Collection Time: 21 10:40 AM    Specimen: Nasopharyngeal   Result Value Ref Range    COVID-19 Virus PCR to [facility] - Source Nasopharyngeal     COVID-19 Virus PCR to [facility] - Result       Test received-See reflex to IDDL test SARS CoV2 (COVID-19) Virus RT-PCR   SARS-CoV-2 COVID-19 Virus (Coronavirus) by PCR    Collection Time: 21 10:40 AM    Specimen: Nasopharyngeal   Result Value Ref Range    SARS-CoV-2 Virus Specimen Source Nasopharyngeal     SARS-CoV-2 PCR Result NEGATIVE     SARS-CoV-2 PCR Comment       Testing was performed using the Aptima SARS-CoV-2 Assay on the Cell[employer] Instrument System.   Additional information about this Emergency Use Authorization (EUA) assay can be found via   the Lab Guide.     ABO/Rh type and screen    Collection Time: 04/15/21  9:05 AM   Result Value Ref Range    ABO O     RH(D) Pos     Antibody Screen Neg     Test Valid Only At [facility]        Specimen Expires 2021    CBC with platelets    Collection Time: 04/15/21  9:05 AM   Result Value Ref Range    WBC 7.7 4.0 - 11.0 10e9/L    RBC Count 4.01 3.8 - 5.2 10e12/L    Hemoglobin 12.0 11.7 " - 15.7 g/dL    Hematocrit 35.2 35.0 - 47.0 %    MCV 88 78 - 100 fl    MCH 29.9 26.5 - 33.0 pg    MCHC 34.1 31.5 - 36.5 g/dL    RDW 12.2 10.0 - 15.0 %    Platelet Count 153 150 - 450 10e9/L   Treponema Abs w Reflex to RPR and Titer    Collection Time: 04/15/21  9:05 AM   Result Value Ref Range    Treponema Antibodies Nonreactive NR^Nonreactive   Hemoglobin    Collection Time: 21  5:54 AM   Result Value Ref Range    Hemoglobin 10.2 (L) 11.7 - 15.7 g/dL   ]    ASSESSMENT  Sheila Calloway is a 31 year old  POD#2 s/p LTCS at 39+1 weeks gestation.  Hemoglobin this morning is 10.2.      PLAN  1) Routine post-partum/post-operative cares.   2) Rh+.  Rhogam not indicated  3) Acute blood loss anemia with Hgb 10.2 postoperatively.    4) Outpatient lactation support  5) Anticipate discharge later today    Paris Garber MD  Obstetrics, Gynecology, and Infertility  2021

## 2021-04-18 NOTE — LACTATION NOTE
This note was copied from a baby's chart.  LC into room for follow up. Plan for LC to come for feed on right side around 0130. Pt states infant breastfeeds well on the left and plan to assist with right side. Will continue to follow.  CHARY Bo RN, BSN, PHN, IBCLC

## 2021-04-18 NOTE — LACTATION NOTE
"Routine visit.   Bottle feeding Similac now per parental choice and states breasts are \"feeling much better\".  Encouraged lots of skin to skin.   Continues to pump. Getting ready for discharge.  Plan: Watch for feeding cues and feed every 2-3 hours and/or on demand. Continue to use feeding log to track intake and appropriate voids and stools. Take feeding log to first follow up appointment or weight check. Encourage skin to skin to promote frequent feedings, thermoregulation and bonding. Follow-up with healthcare provider or lactation consultant for questions or concerns.     Instructed on signs/symptoms of engorgement/ plugged ducts and mastitis.  Instructed on comfort measures and when to call MD.  Discussed how to stop milk production if she chooses.   No further questions at this time.   Will follow as needed.   Salena Hillman BSN, RN, PHN, RNC-MNN, IBCLC    "

## 2021-04-23 ENCOUNTER — HOSPITAL ENCOUNTER (INPATIENT)
Facility: CLINIC | Age: 31
LOS: 3 days | Discharge: HOME | DRG: 776 | End: 2021-04-26
Attending: EMERGENCY MEDICINE | Admitting: OBSTETRICS & GYNECOLOGY
Payer: COMMERCIAL

## 2021-04-23 LAB
ALBUMIN SERPL-MCNC: 3.1 G/DL (ref 3.4–5)
ALBUMIN UR-MCNC: NEGATIVE MG/DL
ALP SERPL-CCNC: 141 U/L (ref 40–150)
ALT SERPL W P-5'-P-CCNC: 43 U/L (ref 0–50)
ANION GAP SERPL CALCULATED.3IONS-SCNC: 6 MMOL/L (ref 3–14)
APPEARANCE UR: CLEAR
AST SERPL W P-5'-P-CCNC: 24 U/L (ref 0–45)
BASOPHILS # BLD AUTO: 0 10E9/L (ref 0–0.2)
BASOPHILS NFR BLD AUTO: 0.3 %
BILIRUB SERPL-MCNC: 0.5 MG/DL (ref 0.2–1.3)
BILIRUB UR QL STRIP: NEGATIVE
BUN SERPL-MCNC: 12 MG/DL (ref 7–30)
CALCIUM SERPL-MCNC: 9.2 MG/DL (ref 8.5–10.1)
CHLORIDE SERPL-SCNC: 106 MMOL/L (ref 94–109)
CO2 SERPL-SCNC: 24 MMOL/L (ref 20–32)
COLOR UR AUTO: YELLOW
CREAT SERPL-MCNC: 0.74 MG/DL (ref 0.52–1.04)
CREAT UR-MCNC: 31 MG/DL
DIFFERENTIAL METHOD BLD: ABNORMAL
EOSINOPHIL # BLD AUTO: 0.1 10E9/L (ref 0–0.7)
EOSINOPHIL NFR BLD AUTO: 1.1 %
ERYTHROCYTE [DISTWIDTH] IN BLOOD BY AUTOMATED COUNT: 11.8 % (ref 10–15)
GFR SERPL CREATININE-BSD FRML MDRD: >90 ML/MIN/{1.73_M2}
GLUCOSE SERPL-MCNC: 97 MG/DL (ref 70–99)
GLUCOSE UR STRIP-MCNC: NEGATIVE MG/DL
HCT VFR BLD AUTO: 35 % (ref 35–47)
HGB BLD-MCNC: 11.5 G/DL (ref 11.7–15.7)
HGB UR QL STRIP: ABNORMAL
IMM GRANULOCYTES # BLD: 0 10E9/L (ref 0–0.4)
IMM GRANULOCYTES NFR BLD: 0.4 %
KETONES UR STRIP-MCNC: NEGATIVE MG/DL
LABORATORY COMMENT REPORT: NORMAL
LEUKOCYTE ESTERASE UR QL STRIP: NEGATIVE
LYMPHOCYTES # BLD AUTO: 2.7 10E9/L (ref 0.8–5.3)
LYMPHOCYTES NFR BLD AUTO: 27.3 %
MCH RBC QN AUTO: 28.8 PG (ref 26.5–33)
MCHC RBC AUTO-ENTMCNC: 32.9 G/DL (ref 31.5–36.5)
MCV RBC AUTO: 88 FL (ref 78–100)
MICROCYTES BLD QL SMEAR: PRESENT
MONOCYTES # BLD AUTO: 0.6 10E9/L (ref 0–1.3)
MONOCYTES NFR BLD AUTO: 6.5 %
MUCOUS THREADS #/AREA URNS LPF: PRESENT /LPF
NEUTROPHILS # BLD AUTO: 6.4 10E9/L (ref 1.6–8.3)
NEUTROPHILS NFR BLD AUTO: 64.4 %
NITRATE UR QL: NEGATIVE
NRBC # BLD AUTO: 0 10*3/UL
NRBC BLD AUTO-RTO: 0 /100
PH UR STRIP: 6.5 PH (ref 5–7)
PLATELET # BLD AUTO: 267 10E9/L (ref 150–450)
PLATELET # BLD EST: ABNORMAL 10*3/UL
POTASSIUM SERPL-SCNC: 3.7 MMOL/L (ref 3.4–5.3)
PROT SERPL-MCNC: 7.3 G/DL (ref 6.8–8.8)
PROT UR-MCNC: 0.07 G/L
PROT/CREAT 24H UR: 0.23 G/G CR (ref 0–0.2)
RBC # BLD AUTO: 3.99 10E12/L (ref 3.8–5.2)
RBC #/AREA URNS AUTO: <1 /HPF (ref 0–2)
SARS-COV-2 RNA RESP QL NAA+PROBE: NEGATIVE
SODIUM SERPL-SCNC: 136 MMOL/L (ref 133–144)
SOURCE: ABNORMAL
SP GR UR STRIP: 1.01 (ref 1–1.03)
SPECIMEN SOURCE: NORMAL
SQUAMOUS #/AREA URNS AUTO: <1 /HPF (ref 0–1)
UROBILINOGEN UR STRIP-MCNC: 0 MG/DL (ref 0–2)
WBC # BLD AUTO: 9.9 10E9/L (ref 4–11)
WBC #/AREA URNS AUTO: 1 /HPF (ref 0–5)

## 2021-04-23 PROCEDURE — 96365 THER/PROPH/DIAG IV INF INIT: CPT

## 2021-04-23 PROCEDURE — 120N000012 HC R&B POSTPARTUM

## 2021-04-23 PROCEDURE — C9803 HOPD COVID-19 SPEC COLLECT: HCPCS

## 2021-04-23 PROCEDURE — 99291 CRITICAL CARE FIRST HOUR: CPT | Mod: 25

## 2021-04-23 PROCEDURE — 258N000003 HC RX IP 258 OP 636: Performed by: OBSTETRICS & GYNECOLOGY

## 2021-04-23 PROCEDURE — 87635 SARS-COV-2 COVID-19 AMP PRB: CPT | Performed by: EMERGENCY MEDICINE

## 2021-04-23 PROCEDURE — 81001 URINALYSIS AUTO W/SCOPE: CPT | Performed by: EMERGENCY MEDICINE

## 2021-04-23 PROCEDURE — 84156 ASSAY OF PROTEIN URINE: CPT | Performed by: EMERGENCY MEDICINE

## 2021-04-23 PROCEDURE — 250N000011 HC RX IP 250 OP 636: Performed by: EMERGENCY MEDICINE

## 2021-04-23 PROCEDURE — 250N000013 HC RX MED GY IP 250 OP 250 PS 637: Performed by: OBSTETRICS & GYNECOLOGY

## 2021-04-23 PROCEDURE — 258N000003 HC RX IP 258 OP 636: Performed by: EMERGENCY MEDICINE

## 2021-04-23 PROCEDURE — 85025 COMPLETE CBC W/AUTO DIFF WBC: CPT | Performed by: EMERGENCY MEDICINE

## 2021-04-23 PROCEDURE — 96375 TX/PRO/DX INJ NEW DRUG ADDON: CPT

## 2021-04-23 PROCEDURE — 80053 COMPREHEN METABOLIC PANEL: CPT | Performed by: EMERGENCY MEDICINE

## 2021-04-23 PROCEDURE — 250N000011 HC RX IP 250 OP 636: Performed by: OBSTETRICS & GYNECOLOGY

## 2021-04-23 RX ORDER — TRANEXAMIC ACID 10 MG/ML
1 INJECTION, SOLUTION INTRAVENOUS EVERY 30 MIN PRN
Status: DISCONTINUED | OUTPATIENT
Start: 2021-04-23 | End: 2021-04-26 | Stop reason: HOSPADM

## 2021-04-23 RX ORDER — SODIUM CHLORIDE 9 MG/ML
INJECTION, SOLUTION INTRAVENOUS CONTINUOUS
Status: DISCONTINUED | OUTPATIENT
Start: 2021-04-23 | End: 2021-04-26 | Stop reason: HOSPADM

## 2021-04-23 RX ORDER — SODIUM CHLORIDE, SODIUM LACTATE, POTASSIUM CHLORIDE, CALCIUM CHLORIDE 600; 310; 30; 20 MG/100ML; MG/100ML; MG/100ML; MG/100ML
10-125 INJECTION, SOLUTION INTRAVENOUS CONTINUOUS
Status: DISCONTINUED | OUTPATIENT
Start: 2021-04-23 | End: 2021-04-24

## 2021-04-23 RX ORDER — LORAZEPAM 2 MG/ML
0.5 INJECTION INTRAMUSCULAR ONCE
Status: COMPLETED | OUTPATIENT
Start: 2021-04-23 | End: 2021-04-23

## 2021-04-23 RX ORDER — ACETAMINOPHEN 500 MG
500-1000 TABLET ORAL EVERY 4 HOURS PRN
COMMUNITY

## 2021-04-23 RX ORDER — HYDRALAZINE HYDROCHLORIDE 20 MG/ML
10 INJECTION INTRAMUSCULAR; INTRAVENOUS
Status: DISCONTINUED | OUTPATIENT
Start: 2021-04-23 | End: 2021-04-26 | Stop reason: HOSPADM

## 2021-04-23 RX ORDER — MAGNESIUM SULFATE HEPTAHYDRATE 40 MG/ML
4 INJECTION, SOLUTION INTRAVENOUS
Status: DISCONTINUED | OUTPATIENT
Start: 2021-04-23 | End: 2021-04-26 | Stop reason: HOSPADM

## 2021-04-23 RX ORDER — SIMETHICONE 80 MG
80 TABLET,CHEWABLE ORAL 4 TIMES DAILY PRN
Status: DISCONTINUED | OUTPATIENT
Start: 2021-04-23 | End: 2021-04-26 | Stop reason: HOSPADM

## 2021-04-23 RX ORDER — NALOXONE HYDROCHLORIDE 0.4 MG/ML
0.2 INJECTION, SOLUTION INTRAMUSCULAR; INTRAVENOUS; SUBCUTANEOUS
Status: DISCONTINUED | OUTPATIENT
Start: 2021-04-23 | End: 2021-04-26 | Stop reason: HOSPADM

## 2021-04-23 RX ORDER — HYDROMORPHONE HYDROCHLORIDE 1 MG/ML
0.2 INJECTION, SOLUTION INTRAMUSCULAR; INTRAVENOUS; SUBCUTANEOUS ONCE
Status: COMPLETED | OUTPATIENT
Start: 2021-04-23 | End: 2021-04-23

## 2021-04-23 RX ORDER — ONDANSETRON 2 MG/ML
4 INJECTION INTRAMUSCULAR; INTRAVENOUS EVERY 6 HOURS PRN
Status: DISCONTINUED | OUTPATIENT
Start: 2021-04-23 | End: 2021-04-26 | Stop reason: HOSPADM

## 2021-04-23 RX ORDER — IBUPROFEN 600 MG/1
600 TABLET, FILM COATED ORAL EVERY 6 HOURS PRN
Status: DISCONTINUED | OUTPATIENT
Start: 2021-04-23 | End: 2021-04-25

## 2021-04-23 RX ORDER — ACETAMINOPHEN 325 MG/1
975 TABLET ORAL EVERY 6 HOURS
Status: DISCONTINUED | OUTPATIENT
Start: 2021-04-23 | End: 2021-04-26 | Stop reason: HOSPADM

## 2021-04-23 RX ORDER — MAGNESIUM SULFATE HEPTAHYDRATE 40 MG/ML
2 INJECTION, SOLUTION INTRAVENOUS ONCE
Status: COMPLETED | OUTPATIENT
Start: 2021-04-23 | End: 2021-04-23

## 2021-04-23 RX ORDER — OXYTOCIN 10 [USP'U]/ML
10 INJECTION, SOLUTION INTRAMUSCULAR; INTRAVENOUS
Status: DISCONTINUED | OUTPATIENT
Start: 2021-04-23 | End: 2021-04-26 | Stop reason: HOSPADM

## 2021-04-23 RX ORDER — MAGNESIUM SULFATE IN WATER 40 MG/ML
2 INJECTION, SOLUTION INTRAVENOUS CONTINUOUS
Status: DISCONTINUED | OUTPATIENT
Start: 2021-04-23 | End: 2021-04-24

## 2021-04-23 RX ORDER — OXYCODONE HYDROCHLORIDE 5 MG/1
5 TABLET ORAL EVERY 6 HOURS PRN
Status: DISCONTINUED | OUTPATIENT
Start: 2021-04-23 | End: 2021-04-26 | Stop reason: HOSPADM

## 2021-04-23 RX ORDER — AMOXICILLIN 250 MG
2 CAPSULE ORAL 2 TIMES DAILY
Status: DISCONTINUED | OUTPATIENT
Start: 2021-04-23 | End: 2021-04-26 | Stop reason: HOSPADM

## 2021-04-23 RX ORDER — HYDRALAZINE HYDROCHLORIDE 20 MG/ML
5 INJECTION INTRAMUSCULAR; INTRAVENOUS
Status: DISCONTINUED | OUTPATIENT
Start: 2021-04-23 | End: 2021-04-26 | Stop reason: HOSPADM

## 2021-04-23 RX ORDER — BISACODYL 10 MG
10 SUPPOSITORY, RECTAL RECTAL DAILY PRN
Status: DISCONTINUED | OUTPATIENT
Start: 2021-04-25 | End: 2021-04-26 | Stop reason: HOSPADM

## 2021-04-23 RX ORDER — LORAZEPAM 2 MG/ML
2 INJECTION INTRAMUSCULAR
Status: DISCONTINUED | OUTPATIENT
Start: 2021-04-23 | End: 2021-04-26 | Stop reason: HOSPADM

## 2021-04-23 RX ORDER — OXYTOCIN/0.9 % SODIUM CHLORIDE 30/500 ML
340 PLASTIC BAG, INJECTION (ML) INTRAVENOUS CONTINUOUS PRN
Status: DISCONTINUED | OUTPATIENT
Start: 2021-04-23 | End: 2021-04-26 | Stop reason: HOSPADM

## 2021-04-23 RX ORDER — AMOXICILLIN 250 MG
1 CAPSULE ORAL 2 TIMES DAILY
Status: DISCONTINUED | OUTPATIENT
Start: 2021-04-23 | End: 2021-04-26 | Stop reason: HOSPADM

## 2021-04-23 RX ORDER — NALOXONE HYDROCHLORIDE 0.4 MG/ML
0.4 INJECTION, SOLUTION INTRAMUSCULAR; INTRAVENOUS; SUBCUTANEOUS
Status: DISCONTINUED | OUTPATIENT
Start: 2021-04-23 | End: 2021-04-26 | Stop reason: HOSPADM

## 2021-04-23 RX ORDER — MODIFIED LANOLIN
OINTMENT (GRAM) TOPICAL
Status: DISCONTINUED | OUTPATIENT
Start: 2021-04-23 | End: 2021-04-26 | Stop reason: HOSPADM

## 2021-04-23 RX ORDER — ONDANSETRON 2 MG/ML
4 INJECTION INTRAMUSCULAR; INTRAVENOUS ONCE
Status: COMPLETED | OUTPATIENT
Start: 2021-04-23 | End: 2021-04-23

## 2021-04-23 RX ORDER — MAGNESIUM SULFATE HEPTAHYDRATE 40 MG/ML
2 INJECTION, SOLUTION INTRAVENOUS
Status: DISCONTINUED | OUTPATIENT
Start: 2021-04-23 | End: 2021-04-26 | Stop reason: HOSPADM

## 2021-04-23 RX ORDER — NIFEDIPINE 10 MG/1
10 CAPSULE ORAL
Status: DISCONTINUED | OUTPATIENT
Start: 2021-04-23 | End: 2021-04-26 | Stop reason: HOSPADM

## 2021-04-23 RX ORDER — LIDOCAINE 40 MG/G
CREAM TOPICAL
Status: DISCONTINUED | OUTPATIENT
Start: 2021-04-23 | End: 2021-04-26 | Stop reason: HOSPADM

## 2021-04-23 RX ORDER — FAMOTIDINE 10 MG
10 TABLET ORAL DAILY PRN
Status: DISCONTINUED | OUTPATIENT
Start: 2021-04-23 | End: 2021-04-26 | Stop reason: HOSPADM

## 2021-04-23 RX ORDER — HYDROCORTISONE 2.5 %
CREAM (GRAM) TOPICAL 3 TIMES DAILY PRN
Status: DISCONTINUED | OUTPATIENT
Start: 2021-04-23 | End: 2021-04-26 | Stop reason: HOSPADM

## 2021-04-23 RX ORDER — CALCIUM GLUCONATE 94 MG/ML
1 INJECTION, SOLUTION INTRAVENOUS
Status: DISCONTINUED | OUTPATIENT
Start: 2021-04-23 | End: 2021-04-26 | Stop reason: HOSPADM

## 2021-04-23 RX ORDER — MAGNESIUM SULFATE HEPTAHYDRATE 500 MG/ML
4 INJECTION, SOLUTION INTRAMUSCULAR; INTRAVENOUS
Status: DISCONTINUED | OUTPATIENT
Start: 2021-04-23 | End: 2021-04-26 | Stop reason: HOSPADM

## 2021-04-23 RX ADMIN — LORAZEPAM 0.5 MG: 2 INJECTION INTRAMUSCULAR; INTRAVENOUS at 21:04

## 2021-04-23 RX ADMIN — SODIUM CHLORIDE, POTASSIUM CHLORIDE, SODIUM LACTATE AND CALCIUM CHLORIDE 75 ML/HR: 600; 310; 30; 20 INJECTION, SOLUTION INTRAVENOUS at 23:06

## 2021-04-23 RX ADMIN — ONDANSETRON 4 MG: 2 INJECTION INTRAMUSCULAR; INTRAVENOUS at 20:13

## 2021-04-23 RX ADMIN — HYDROMORPHONE HYDROCHLORIDE 0.2 MG: 1 INJECTION, SOLUTION INTRAMUSCULAR; INTRAVENOUS; SUBCUTANEOUS at 20:13

## 2021-04-23 RX ADMIN — SODIUM CHLORIDE 1000 ML: 9 INJECTION, SOLUTION INTRAVENOUS at 20:12

## 2021-04-23 RX ADMIN — SODIUM CHLORIDE 1000 ML: 9 INJECTION, SOLUTION INTRAVENOUS at 20:39

## 2021-04-23 RX ADMIN — ACETAMINOPHEN 975 MG: 325 TABLET, FILM COATED ORAL at 23:11

## 2021-04-23 RX ADMIN — MAGNESIUM SULFATE HEPTAHYDRATE 2 G: 40 INJECTION, SOLUTION INTRAVENOUS at 20:29

## 2021-04-23 RX ADMIN — MAGNESIUM SULFATE IN WATER 2 G/HR: 40 INJECTION, SOLUTION INTRAVENOUS at 23:05

## 2021-04-23 RX ADMIN — IBUPROFEN 600 MG: 600 TABLET ORAL at 23:11

## 2021-04-23 RX ADMIN — SENNOSIDES AND DOCUSATE SODIUM 2 TABLET: 8.6; 5 TABLET ORAL at 23:11

## 2021-04-23 RX ADMIN — OXYCODONE HYDROCHLORIDE 5 MG: 5 TABLET ORAL at 23:11

## 2021-04-23 ASSESSMENT — ENCOUNTER SYMPTOMS
SLEEP DISTURBANCE: 0
HEADACHES: 1
FEVER: 0
NAUSEA: 0

## 2021-04-23 ASSESSMENT — MIFFLIN-ST. JEOR: SCORE: 1569.97

## 2021-04-23 NOTE — ED TRIAGE NOTES
Patient presents to the ED for a headache.  She is a week post partum and states her OB feels it may be a spinal headache.  Patient is very tearful in triage.

## 2021-04-24 ENCOUNTER — HEALTH MAINTENANCE LETTER (OUTPATIENT)
Age: 31
End: 2021-04-24

## 2021-04-24 ENCOUNTER — ANESTHESIA (OUTPATIENT)
Dept: OBGYN | Facility: CLINIC | Age: 31
End: 2021-04-24

## 2021-04-24 ENCOUNTER — ANESTHESIA EVENT (OUTPATIENT)
Dept: OBGYN | Facility: CLINIC | Age: 31
End: 2021-04-24

## 2021-04-24 LAB
ALT SERPL W P-5'-P-CCNC: 34 U/L (ref 0–50)
AST SERPL W P-5'-P-CCNC: 15 U/L (ref 0–45)
CREAT SERPL-MCNC: 0.87 MG/DL (ref 0.52–1.04)
ERYTHROCYTE [DISTWIDTH] IN BLOOD BY AUTOMATED COUNT: 11.8 % (ref 10–15)
GFR SERPL CREATININE-BSD FRML MDRD: 87 ML/MIN/{1.73_M2}
HCT VFR BLD AUTO: 34.3 % (ref 35–47)
HGB BLD-MCNC: 11.3 G/DL (ref 11.7–15.7)
MAGNESIUM SERPL-MCNC: 5.4 MG/DL (ref 1.6–2.3)
MCH RBC QN AUTO: 29.4 PG (ref 26.5–33)
MCHC RBC AUTO-ENTMCNC: 32.9 G/DL (ref 31.5–36.5)
MCV RBC AUTO: 89 FL (ref 78–100)
PLATELET # BLD AUTO: 259 10E9/L (ref 150–450)
RBC # BLD AUTO: 3.85 10E12/L (ref 3.8–5.2)
WBC # BLD AUTO: 8.7 10E9/L (ref 4–11)

## 2021-04-24 PROCEDURE — 250N000011 HC RX IP 250 OP 636: Performed by: OBSTETRICS & GYNECOLOGY

## 2021-04-24 PROCEDURE — 120N000012 HC R&B POSTPARTUM

## 2021-04-24 PROCEDURE — 83735 ASSAY OF MAGNESIUM: CPT | Performed by: OBSTETRICS & GYNECOLOGY

## 2021-04-24 PROCEDURE — 85027 COMPLETE CBC AUTOMATED: CPT | Performed by: OBSTETRICS & GYNECOLOGY

## 2021-04-24 PROCEDURE — 258N000003 HC RX IP 258 OP 636: Performed by: OBSTETRICS & GYNECOLOGY

## 2021-04-24 PROCEDURE — 250N000013 HC RX MED GY IP 250 OP 250 PS 637: Performed by: OBSTETRICS & GYNECOLOGY

## 2021-04-24 PROCEDURE — 82565 ASSAY OF CREATININE: CPT | Performed by: OBSTETRICS & GYNECOLOGY

## 2021-04-24 PROCEDURE — 36415 COLL VENOUS BLD VENIPUNCTURE: CPT | Performed by: OBSTETRICS & GYNECOLOGY

## 2021-04-24 PROCEDURE — 84460 ALANINE AMINO (ALT) (SGPT): CPT | Performed by: OBSTETRICS & GYNECOLOGY

## 2021-04-24 PROCEDURE — 84450 TRANSFERASE (AST) (SGOT): CPT | Performed by: OBSTETRICS & GYNECOLOGY

## 2021-04-24 RX ORDER — DIPHENHYDRAMINE HYDROCHLORIDE 50 MG/ML
25 INJECTION INTRAMUSCULAR; INTRAVENOUS ONCE
Status: COMPLETED | OUTPATIENT
Start: 2021-04-24 | End: 2021-04-24

## 2021-04-24 RX ORDER — MAGNESIUM SULFATE IN WATER 40 MG/ML
1.5 INJECTION, SOLUTION INTRAVENOUS CONTINUOUS
Status: DISCONTINUED | OUTPATIENT
Start: 2021-04-24 | End: 2021-04-24

## 2021-04-24 RX ADMIN — IBUPROFEN 600 MG: 600 TABLET ORAL at 11:53

## 2021-04-24 RX ADMIN — ACETAMINOPHEN 975 MG: 325 TABLET, FILM COATED ORAL at 11:52

## 2021-04-24 RX ADMIN — SENNOSIDES AND DOCUSATE SODIUM 2 TABLET: 8.6; 5 TABLET ORAL at 11:52

## 2021-04-24 RX ADMIN — MAGNESIUM SULFATE IN WATER 2 G/HR: 40 INJECTION, SOLUTION INTRAVENOUS at 09:19

## 2021-04-24 RX ADMIN — ACETAMINOPHEN 975 MG: 325 TABLET, FILM COATED ORAL at 23:47

## 2021-04-24 RX ADMIN — PROCHLORPERAZINE EDISYLATE 10 MG: 5 INJECTION INTRAMUSCULAR; INTRAVENOUS at 09:20

## 2021-04-24 RX ADMIN — ACETAMINOPHEN 975 MG: 325 TABLET, FILM COATED ORAL at 05:11

## 2021-04-24 RX ADMIN — DIPHENHYDRAMINE HYDROCHLORIDE 25 MG: 50 INJECTION, SOLUTION INTRAMUSCULAR; INTRAVENOUS at 09:22

## 2021-04-24 RX ADMIN — OXYCODONE HYDROCHLORIDE 5 MG: 5 TABLET ORAL at 13:19

## 2021-04-24 RX ADMIN — IBUPROFEN 600 MG: 600 TABLET ORAL at 23:47

## 2021-04-24 RX ADMIN — OXYCODONE HYDROCHLORIDE 5 MG: 5 TABLET ORAL at 05:12

## 2021-04-24 RX ADMIN — SENNOSIDES AND DOCUSATE SODIUM 2 TABLET: 8.6; 5 TABLET ORAL at 21:23

## 2021-04-24 RX ADMIN — ACETAMINOPHEN 975 MG: 325 TABLET, FILM COATED ORAL at 18:10

## 2021-04-24 RX ADMIN — SODIUM CHLORIDE, POTASSIUM CHLORIDE, SODIUM LACTATE AND CALCIUM CHLORIDE 75 ML/HR: 600; 310; 30; 20 INJECTION, SOLUTION INTRAVENOUS at 11:46

## 2021-04-24 RX ADMIN — MAGNESIUM SULFATE IN WATER 1.5 G/HR: 40 INJECTION, SOLUTION INTRAVENOUS at 14:33

## 2021-04-24 RX ADMIN — IBUPROFEN 600 MG: 600 TABLET ORAL at 18:10

## 2021-04-24 RX ADMIN — IBUPROFEN 600 MG: 600 TABLET ORAL at 05:12

## 2021-04-24 ASSESSMENT — MIFFLIN-ST. JEOR: SCORE: 1594.92

## 2021-04-24 NOTE — PLAN OF CARE
Patient has been on magnesium since last night and has been tolerating well.  She is voiding well and tolerating diet.  Her headache was around 5/10 this morning.  After a combination of Benadryl and Compazine IV she fell asleep and felt much better when she woke up.  Anesthesiologist saw patient said she didn't think it was a spinal headache; see note.

## 2021-04-24 NOTE — PROGRESS NOTES
RECEIVING UNIT ED HANDOFF REVIEW    ED Nurse Handoff Report was reviewed by: Lili Painting RN on April 23, 2021 at 9:50 PM

## 2021-04-24 NOTE — PHARMACY-ADMISSION MEDICATION HISTORY
Pharmacy Medication History  Admission medication history interview status for the 4/23/2021  admission is complete. See EPIC admission navigator for prior to admission medications     Location of Interview: Patient room  Medication history sources: Patient, Patient's family/friend (Spouse), Surescripts and Care Everywhere    Medication reconciliation completed by provider prior to medication history? No    Time spent in this activity: 10 minutes    Prior to Admission medications    Medication Sig Last Dose Taking? Auth Provider   acetaminophen (TYLENOL) 500 MG tablet Take 500-1,000 mg by mouth every 4 hours as needed for mild pain 4/23/2021 at Unknown time Yes Unknown, Entered By History   famotidine (PEPCID) 10 MG tablet Take 10 mg by mouth daily as needed 4/22/2021 at PM Yes Reported, Patient   ibuprofen (ADVIL/MOTRIN) 600 MG tablet Take 1 tablet (600 mg) by mouth every 6 hours as needed for pain 4/23/2021 at 1700 Yes Paris Garber MD   oxyCODONE (ROXICODONE) 5 MG tablet Take 1 tablet (5 mg) by mouth every 6 hours as needed for severe pain maximum 6 tablet(s) per day 4/23/2021 at 1130 Yes Paris Garber MD       The information provided in this note is only as accurate as the sources available at the time of update(s)

## 2021-04-24 NOTE — ED NOTES
Pt.  delivery 1 week ago onset yesterday morning approx 0830 w/ rt. Sided headache frontal lateral and posterior. Some nausea noted intermittently.

## 2021-04-24 NOTE — ED NOTES
[facility]  ED Nurse Handoff Report    ED Chief complaint: Headache      ED Diagnosis:   Final diagnoses:   Preeclampsia in postpartum period       Code Status: full    Allergies: No Known Allergies    Patient Story: Pt. States lt. Sided headache w/ nausea 1 week post partum  Focused Assessment:  Pt. To ED w/ HtN, lt. Sided headache and nausea 1 week  post partum     Treatments and/or interventions provided:   Labs Ordered and Resulted from Time of ED Arrival Up to the Time of Departure from the ED   CBC WITH PLATELETS DIFFERENTIAL - Abnormal; Notable for the following components:       Result Value    Hemoglobin 11.5 (*)     All other components within normal limits   COMPREHENSIVE METABOLIC PANEL - Abnormal; Notable for the following components:    Albumin 3.1 (*)     All other components within normal limits   ROUTINE UA WITH MICROSCOPIC   PROTEIN  RANDOM URINE   SARS-COV-2 (COVID-19) VIRUS RT-PCR   VITAL SIGNS   NO   NS bolus x2 liters zofran 4 mg ivp, dilaudid 0 .2mg ivp, ativan0.5mg    Patient's response to treatments and/or interventions: feels a little better    To be done/followed up on inpatient unit:   admit orders    Does this patient have any cognitive concerns?: no    Activity level - Baseline/Home:  Up ad katelyn  Activity Level - Current:   Up ad katelyn    Patient's Preferred language: English   Needed?: no    Isolation: none  Infection: na  Patient tested for COVID 19 prior to admission: yes inprocess  Bariatric?: no    Vital Signs:   Vitals:    21 2030 21 2045 21 2100 21 2115   BP:  (!) 154/89 (!) 159/92 (!) 155/93   Pulse: 57 62 64 65   Resp: 16 16  16   Temp:       TempSrc:       SpO2: 99% 99% 98% 98%   Weight:       Height:           Cardiac Rhythm:     Was the PSS-3 completed:   yes  What interventions are required if any?  none             Family Comments: na  OBS brochure/video discussed/provided to patient/family: no              Name of  person given brochure if not patient: na              Relationship to patient: na    For the majority of the shift this patient's behavior was green  Behavioral interventions performed were none    ED NURSE PHONE NUMBER: [phone]

## 2021-04-24 NOTE — H&P
[facility] Labor and Delivery History and Physical    Irma Claloway MRN# [id number]   Age: 31 year old YOB: 1990     Date of Admission:  2021    Primary care provider: Paris Garber  Primary clinic: Obstetrics, Gynecology, and Infertility           HPI:   Irma Calloway is a 31 year old  POD#7 s/p  delivery admitted for postpartum preeclampsia.  She started having a headache 3 days ago.  She felt it was a little better with laying down.  It was coming and going over those days and the day of admission she describes a headache of moderate severity radiating from behind her eye to the back of her head. She has tried tylenol, caffeine, and hydration.  She stopped nursing a few days ago.  Bleeding has been normal.  She denies RUQ pain or epigastric pain.  She has a history of white coat hypertension in clinic. She did not have preeclampsia during either of her pregnancies.          Pregnancy history:     OBSTETRIC HISTORY:  OB History    Para Term  AB Living   5 2 2 0 0 2   SAB TAB Ectopic Multiple Live Births   0 0 0 0 2      # Outcome Date GA Lbr Lenny/2nd Weight Sex Delivery Anes PTL Lv   5 Term 21 39w1d  3.58 kg (7 lb 14.3 oz) F CS-LTranv   FRANCIS      Name: JORGE LUISFEMALE-IRMA      Apgar1: 8  Apgar5: 9   4 Term 12/15/18 41w0d 07:00 / 03:08 3.72 kg (8 lb 3.2 oz) M Vag-Vacuum EPI N FRANCIS      Name: ANA CALLOWAY      Apgar1: 8  Apgar5: 9   3             2             1                 EDC: Estimated Date of Delivery: Data Unavailable    Complications: None  Patient Active Problem List   Diagnosis     Pregnancy, supervision, normal, first     VAVD     S/P  section     Preeclampsia in postpartum period       Prenatal Labs:   Lab Results   Component Value Date    ABO O 04/15/2021    RH Pos 04/15/2021    AS Neg 04/15/2021    HEPBANG negative 2018    CHPCRT Negative 2020    GCPCRT Negative  "2020    RUBELLAABIGG 3.95 2018    HGB 11.5 (L) 2021           Maternal Past Medical History:   No past medical history on file.  Past Surgical History:   Procedure Laterality Date      SECTION N/A 2021    Procedure: PRIMARY  SECTION;  Surgeon: Paris Garber MD;  Location:  L+D     D & C      times 3      DILATION AND CURETTAGE SUCTION N/A 2017    Procedure: DILATION AND CURETTAGE SUCTION;  SUCTION DILATION AND CURETTAGE ;  Surgeon: Paris Garber MD;  Location:  SD     DILATION AND CURETTAGE SUCTION N/A 2020    Procedure: SUCTION DILATION AND CURETTAGE;  Surgeon: Paris Garber MD;  Location:  OR     HC TOOTH EXTRACTION W/FORCEP      wisdom teeth extraction              Family History:   The family history is not on file.          Social History:     Social History     Tobacco Use     Smoking status: Never Smoker     Smokeless tobacco: Never Used   Substance Use Topics     Alcohol use: No     Frequency: Never     Comment: 2/week; nothing during pregnancy            Review of Systems:   The Review of Systems is negative other than noted in the HPI          Physical Exam:     Patient Vitals for the past 8 hrs:   BP Temp Temp src Pulse Resp SpO2 Height Weight   21 2115 (!) 155/93 -- -- 65 16 98 % -- --   21 (!) 159/92 -- -- 64 -- 98 % -- --   21 (!) 154/89 -- -- 62 16 99 % -- --   21 -- -- -- 57 16 99 % -- --   21 (!) 173/95 -- -- 69 18 99 % -- --   21 183 (!) 154/89 -- -- -- -- -- -- --   21 (!) 164/88 97.7  F (36.5  C) Temporal 59 16 98 % 1.727 m (5' 8\") 81.6 kg (180 lb)     Gen: Appears uncomfortable  CV: WWP  Resp: Nonlabored breathing  Abd: Soft, nontender, incision C/D/I  Ext: nontender no edema  Recent Labs   Lab 21      POTASSIUM 3.7   CHLORIDE 106   CANDI 9.2   CO2 24   BUN 12   CR 0.74   AST 24   ALT 43     Recent Labs   Lab 21 " 21  0554   WBC 9.9  --    RBC 3.99  --    HGB 11.5* 10.2*   HCT 35.0  --      --        Received 2g magnesium sulfate in the ER        Assessment:   Sheila Calloway is a 31 year old  at POD#7 s/p CS admitted for postpartum preeclampsia .        Plan:   1. Continue magnesium at 2 g/hr for seizure prophylaxis.    2. If blood pressures continue >150/100 I would recommend nifedipine XL 30 mg since she has a low resting heart rate  3. Analgesics PRN  4. Continue to consider spinal headache and if positional headache persists will have anesthesia evaluate.   Anticipate 2-3 day hospitalization    Paris Garber MD   Obstetrics, Gynecology, and Infertility

## 2021-04-24 NOTE — PROGRESS NOTES
"Anesthesiology Consult for assessment of possible spinal headache:    Pt is a 30 y/o F POD #8 s/p C/S with a straightforward spinal placement.  Pt was re-admitted POD #7 with HTN and HA's.  She was started on a magnesium infusion.  Her headaches began POD#5.  This HA originated in her Right eye (which is typical for her) but then began radiating to the back of her head.  Hydration, caffeine and lying down all with minimal benefit.  She took one of her oxy with significant relief.   Headache seems to slowly be improving.  No vision changes, sensitivity to light, or N/V.  She is not breastfeeding.  Pt's BP's have been improving.      /83   Pulse 84   Temp 36.7  C (98.1  F) (Oral)   Resp 20   Ht 1.727 m (5' 8\")   Wt 84.1 kg (185 lb 8 oz)   SpO2 99%   BMI 28.21 kg/m      HEENT:  Well appearing female not in acute distress  Resp:  CTAB  CV:  NSR  Neuro:  No gross neurologic abnormalities      Plan:  30 y/o F POD #8 s/p C/S with spinal anesthesia.  MOORE's began POD #5 - have been gradually improving    -The timing, lack of a clear postural component, and any other associated symptoms lead away from a diagnosis of spinal HA  -Continue conservative management.    -Please call for re-assessment if HA's worsen.    Ludivina Winter MD        Lab Results   Component Value Date    WBC 8.7 04/24/2021     Lab Results   Component Value Date    RBC 3.85 04/24/2021     Lab Results   Component Value Date    HGB 11.3 04/24/2021     Lab Results   Component Value Date    HCT 34.3 04/24/2021     No components found for: MCT  Lab Results   Component Value Date    MCV 89 04/24/2021     Lab Results   Component Value Date    MCH 29.4 04/24/2021     Lab Results   Component Value Date    MCHC 32.9 04/24/2021     Lab Results   Component Value Date    RDW 11.8 04/24/2021     Lab Results   Component Value Date     04/24/2021         "

## 2021-04-24 NOTE — PROVIDER NOTIFICATION
GUICHO for DrJon  Anesthesia did not think patient had a spinal headache.  Magnesium level back and bp is improving.  See new order.

## 2021-04-24 NOTE — PLAN OF CARE
BPs improving, headache also improving. Mag sulfate running. Reflexes normal, no clonus present. Pain controlled with tylenol, ibuprofen, and oxycodone. Hot packs for comfort.

## 2021-04-24 NOTE — ED PROVIDER NOTES
History   Chief Complaint:  Headache     The history is provided by the patient.      Sheila Calloway is a 31 year old female that is  with history of s/p  on  at 39 weeks and 1 day, who presents with headache. The patient reports that she had a epidural with her  and has been unable to control her headache at home with oxycodone, ibuprofen, and tylenol. She reports that she had onset of a headache yesterday and has slept all day as well. She reports that she woke up with the headache again. She denies troubles sleeping. She states that her family is in town helping with the kids and taking care of the . Her  notes that they called OB and were recommended to come into the ED. She reports that laying down helps her pain. She denies fevers, is not currently nursing, or troubles sleeping. She reports that she has not had a headache not go away. She states that at 1700 tonight prior to coming in she had taken tylenol, 600 mg of ibuprofen and some magnesium as she was told by her mother who was a nurse that it would help.       Review of Systems   Constitutional: Negative for fever.   Gastrointestinal: Negative for nausea.   Neurological: Positive for headaches.   Psychiatric/Behavioral: Negative for sleep disturbance.     10 point review of systems performed and is negative except as above and in HPI.       Allergies:  No known drug allergies     Medications:  Pepcid   Oxycodone   Prenatals     Past Medical History:         Past Surgical History:    C- section  Dilation and curettage x2    Family History:    The patient denies past family history.     Social History:  Smoking status: never smoker  Alcohol use: no  Drug use: no  PCP: Paris Garber  Presents to the ED with   She notes that her family is in town helping and staying with the kids.     Physical Exam     Patient Vitals for the past 24 hrs:   BP Temp Temp src Pulse Resp SpO2 Height Weight  "  21 -- -- -- -- -- 99 % -- --   21 (!) 155/93 -- -- 65 16 98 % -- --   21 (!) 159/92 -- -- 64 -- 98 % -- --   21 (!) 154/89 -- -- 62 16 99 % -- --   21 -- -- -- 57 16 99 % -- --   21 (!) 173/95 -- -- 69 18 99 % -- --   21 183 (!) 154/89 -- -- -- -- -- -- --   21 181 (!) 164/88 97.7  F (36.5  C) Temporal 59 16 98 % 1.727 m (5' 8\") 81.6 kg (180 lb)       Physical Exam  General: Resting on the gurney, appears uncomfortable  Head:  The scalp, face, and head appear normal  Neck:  No nuchal rigidity.  Mouth/Throat: Mucus membranes are moist  CV:  Regular rate    Normal S1 and S2  No pathological murmur   Resp:  Breath sounds clear and equal bilaterally    Non-labored, no retractions or accessory muscle use    No coarseness    No wheezing   GI:  Abdomen is soft, no rigidity    No tenderness to palpation  MS:  Normal motor assessment of all extremities.    Good capillary refill noted.  Skin:   incision is clean, dry, and intact. No rash or lesions noted.  Neuro:   Speech is normal and fluent. No apparent deficit.  Psych: Awake. Alert.  Normal affect.      Appropriate interactions.       Emergency Department Course     Laboratory:  CBC: WBC 9.9, HGB 11.5(L),    CMP: Albumin 3.1(L), o/w WNL (Creatinine: 0.74)    COVID-19 virus Swab PCR: negative   UA: moderate blood, mucous present (A)  Magnesium: pending   Protein random urine with creat ratio: pending    Emergency Department Course:  Reviewed:  I reviewed the patient's nursing notes, vitals, past medical records, Care Everywhere.     Assessments:   I performed an assessment and examination of the patient as noted above.  Findings and plan explained to the Patient who consents to admission. Discussed the patient with Dr. Garber, who will admit the patient to a postaprtum bed for further monitoring, evaluation, and treatment.     Consults:   I spoke with Dr. Garber of " the OGI service regarding patient's presentation, findings, and plan of care.     2047 I spoke with Dr. Garber of the OGI service regarding patient's presentation, findings, and plan of care. She reports that she would like her to be admitted to post partum floor.     Interventions:  2012 NS 1L IV Bolus   2013 Zofran 4mg IV   2013 Dilaudid, 0.2 mg, IV   2029 Magnesium sulfate, 2 g, IV  2039 NS 1L IV Bolus   2104 Lorazepam, 0.2 mg, IV    Disposition:  The patient was admitted to the hospital under the care of Dr. Garber.       Impression & Plan   Medical Decision Making:  Sheila Calloway is a 31 year old female presents to the emergency room with headache, which she was initially concerned was a post lumbar puncture headache.  Her headache is not a classically described post lumbar puncture headache although it is somewhat better lying down it is not markedly relieved.  She is also noted to be newly hypertensive.  I discussed her case with her on-call OB provider who was concerned for postpartum preeclampsia as was high.  Labs were obtained and her OB provider came to see her in the emergency department.  2 g of magnesium were ordered per her OB provider's request and she will be admitted for further magnesium and close postpartum monitoring.  I do not believe her symptoms represent meningitis, she has no fever, no nuchal rigidity, nor do I think that they represent subarachnoid hemorrhage as her headache was not sudden onset which she has no photosensitivity or nuchal rigidity.      Covid-19  Sheila Calloway was evaluated during a global COVID-19 pandemic, which necessitated consideration that the patient might be at risk for infection with the SARS-CoV-2 virus that causes COVID-19.   Applicable protocols for evaluation were followed during the patient's care.     Diagnosis:    ICD-10-CM    1. Preeclampsia in postpartum period  O14.95 CBC with platelets differential     UA with Microscopic     Protein   random urine with Creat Ratio     Asymptomatic SARS-CoV-2 COVID-19 Virus (Coronavirus) by PCR     Creatinine urine calculation only     Creatinine urine calculation only       Scribe Disclosure:  I, Leatha Kam, am serving as a scribe at 7:43 PM on 4/23/2021 to document services personally performed by Marie Gomes MD based on my observations and the provider's statements to me.           Marie Gomes MD  04/24/21 0013

## 2021-04-24 NOTE — PROGRESS NOTES
"HD #2/ POD#8  S.  Headache little better, rating 4/10. No visual changes, epigastric pain.  O.  AVSS  /88   Pulse 66   Temp 98.1  F (36.7  C) (Oral)   Resp 20   Ht 1.727 m (5' 8\")   Wt 84.1 kg (185 lb 8 oz)   SpO2 99%   BMI 28.21 kg/m      Intake/Output Summary (Last 24 hours) at 4/24/2021 0856  Last data filed at 4/24/2021 0510  Gross per 24 hour   Intake 1100 ml   Output 1800 ml   Net -700 ml     Abd soft, nontender  Incision healing well    Cr 0.87  AST 15  ALT 34  Plts 259k    A.  HD #2/POD#8 admitted with HTN, persistent headache, currently on magnesium 2g/hr.  Headache little better, worsens when she lifts her head.    P.  Add magnesium level to labs  Magnesium sulfate at 2 g/hour  Ask anesthesia to stop by to see if spinal headache a possibility  Consider brain MRI if HA persists  Give benadryl/compazine to see if this helps /LBakerMD  "

## 2021-04-25 ENCOUNTER — APPOINTMENT (OUTPATIENT)
Dept: MRI IMAGING | Facility: CLINIC | Age: 31
DRG: 776 | End: 2021-04-25
Attending: OBSTETRICS & GYNECOLOGY
Payer: COMMERCIAL

## 2021-04-25 LAB
ALT SERPL W P-5'-P-CCNC: 30 U/L (ref 0–50)
AST SERPL W P-5'-P-CCNC: 13 U/L (ref 0–45)
CREAT SERPL-MCNC: 0.84 MG/DL (ref 0.52–1.04)
ERYTHROCYTE [DISTWIDTH] IN BLOOD BY AUTOMATED COUNT: 11.9 % (ref 10–15)
GFR SERPL CREATININE-BSD FRML MDRD: >90 ML/MIN/{1.73_M2}
HCT VFR BLD AUTO: 34.6 % (ref 35–47)
HGB BLD-MCNC: 11.5 G/DL (ref 11.7–15.7)
MAGNESIUM SERPL-MCNC: 3.4 MG/DL (ref 1.6–2.3)
MCH RBC QN AUTO: 29.4 PG (ref 26.5–33)
MCHC RBC AUTO-ENTMCNC: 33.2 G/DL (ref 31.5–36.5)
MCV RBC AUTO: 89 FL (ref 78–100)
PLATELET # BLD AUTO: 282 10E9/L (ref 150–450)
RBC # BLD AUTO: 3.91 10E12/L (ref 3.8–5.2)
WBC # BLD AUTO: 7.9 10E9/L (ref 4–11)

## 2021-04-25 PROCEDURE — 250N000013 HC RX MED GY IP 250 OP 250 PS 637: Performed by: OBSTETRICS & GYNECOLOGY

## 2021-04-25 PROCEDURE — 84460 ALANINE AMINO (ALT) (SGPT): CPT | Performed by: OBSTETRICS & GYNECOLOGY

## 2021-04-25 PROCEDURE — 83735 ASSAY OF MAGNESIUM: CPT | Performed by: OBSTETRICS & GYNECOLOGY

## 2021-04-25 PROCEDURE — 85027 COMPLETE CBC AUTOMATED: CPT | Performed by: OBSTETRICS & GYNECOLOGY

## 2021-04-25 PROCEDURE — 36415 COLL VENOUS BLD VENIPUNCTURE: CPT | Performed by: OBSTETRICS & GYNECOLOGY

## 2021-04-25 PROCEDURE — 70553 MRI BRAIN STEM W/O & W/DYE: CPT

## 2021-04-25 PROCEDURE — 250N000011 HC RX IP 250 OP 636: Performed by: OBSTETRICS & GYNECOLOGY

## 2021-04-25 PROCEDURE — 82565 ASSAY OF CREATININE: CPT | Performed by: OBSTETRICS & GYNECOLOGY

## 2021-04-25 PROCEDURE — 84450 TRANSFERASE (AST) (SGOT): CPT | Performed by: OBSTETRICS & GYNECOLOGY

## 2021-04-25 PROCEDURE — 120N000012 HC R&B POSTPARTUM

## 2021-04-25 RX ORDER — KETOROLAC TROMETHAMINE 30 MG/ML
15 INJECTION, SOLUTION INTRAMUSCULAR; INTRAVENOUS EVERY 8 HOURS PRN
Status: DISCONTINUED | OUTPATIENT
Start: 2021-04-25 | End: 2021-04-26 | Stop reason: HOSPADM

## 2021-04-25 RX ORDER — HYDROXYZINE HYDROCHLORIDE 50 MG/1
50 TABLET, FILM COATED ORAL EVERY 6 HOURS PRN
Status: DISCONTINUED | OUTPATIENT
Start: 2021-04-25 | End: 2021-04-26 | Stop reason: HOSPADM

## 2021-04-25 RX ORDER — LABETALOL 100 MG/1
200 TABLET, FILM COATED ORAL EVERY 12 HOURS SCHEDULED
Status: DISCONTINUED | OUTPATIENT
Start: 2021-04-25 | End: 2021-04-26

## 2021-04-25 RX ORDER — LORAZEPAM 2 MG/ML
0.5 INJECTION INTRAMUSCULAR ONCE
Status: COMPLETED | OUTPATIENT
Start: 2021-04-25 | End: 2021-04-26

## 2021-04-25 RX ORDER — ONDANSETRON 4 MG/1
8 TABLET, ORALLY DISINTEGRATING ORAL EVERY 6 HOURS PRN
Status: DISCONTINUED | OUTPATIENT
Start: 2021-04-25 | End: 2021-04-26 | Stop reason: HOSPADM

## 2021-04-25 RX ORDER — TRAMADOL HYDROCHLORIDE 50 MG/1
50 TABLET ORAL EVERY 6 HOURS PRN
Status: DISCONTINUED | OUTPATIENT
Start: 2021-04-25 | End: 2021-04-26 | Stop reason: HOSPADM

## 2021-04-25 RX ORDER — NIFEDIPINE 30 MG/1
30 TABLET, EXTENDED RELEASE ORAL DAILY
Status: DISCONTINUED | OUTPATIENT
Start: 2021-04-25 | End: 2021-04-26

## 2021-04-25 RX ORDER — KETOROLAC TROMETHAMINE 30 MG/ML
15 INJECTION, SOLUTION INTRAMUSCULAR; INTRAVENOUS EVERY 8 HOURS PRN
Status: DISCONTINUED | OUTPATIENT
Start: 2021-04-25 | End: 2021-04-25

## 2021-04-25 RX ADMIN — ACETAMINOPHEN 975 MG: 325 TABLET, FILM COATED ORAL at 11:18

## 2021-04-25 RX ADMIN — LABETALOL HYDROCHLORIDE 200 MG: 100 TABLET, FILM COATED ORAL at 08:22

## 2021-04-25 RX ADMIN — ACETAMINOPHEN 975 MG: 325 TABLET, FILM COATED ORAL at 17:24

## 2021-04-25 RX ADMIN — KETOROLAC TROMETHAMINE 15 MG: 30 INJECTION, SOLUTION INTRAMUSCULAR at 08:51

## 2021-04-25 RX ADMIN — PROCHLORPERAZINE EDISYLATE 5 MG: 5 INJECTION INTRAMUSCULAR; INTRAVENOUS at 23:02

## 2021-04-25 RX ADMIN — NIFEDIPINE 30 MG: 30 TABLET, FILM COATED, EXTENDED RELEASE ORAL at 04:33

## 2021-04-25 RX ADMIN — HYDRALAZINE HYDROCHLORIDE 5 MG: 20 INJECTION INTRAMUSCULAR; INTRAVENOUS at 21:49

## 2021-04-25 RX ADMIN — HYDRALAZINE HYDROCHLORIDE 10 MG: 20 INJECTION INTRAMUSCULAR; INTRAVENOUS at 22:15

## 2021-04-25 RX ADMIN — HYDROXYZINE HYDROCHLORIDE 50 MG: 50 TABLET, FILM COATED ORAL at 21:58

## 2021-04-25 RX ADMIN — TRAMADOL HYDROCHLORIDE 50 MG: 50 TABLET, FILM COATED ORAL at 11:19

## 2021-04-25 RX ADMIN — ONDANSETRON 8 MG: 4 TABLET, ORALLY DISINTEGRATING ORAL at 20:40

## 2021-04-25 RX ADMIN — SENNOSIDES AND DOCUSATE SODIUM 1 TABLET: 8.6; 5 TABLET ORAL at 11:56

## 2021-04-25 RX ADMIN — TRAMADOL HYDROCHLORIDE 50 MG: 50 TABLET, FILM COATED ORAL at 05:58

## 2021-04-25 RX ADMIN — KETOROLAC TROMETHAMINE 15 MG: 30 INJECTION, SOLUTION INTRAMUSCULAR at 17:23

## 2021-04-25 RX ADMIN — ONDANSETRON 8 MG: 4 TABLET, ORALLY DISINTEGRATING ORAL at 08:11

## 2021-04-25 RX ADMIN — LABETALOL HYDROCHLORIDE 200 MG: 100 TABLET, FILM COATED ORAL at 20:40

## 2021-04-25 ASSESSMENT — MIFFLIN-ST. JEOR: SCORE: 1580.86

## 2021-04-25 NOTE — PROVIDER NOTIFICATION
0725: Called Dr. Mera to update her on patient's nausea/vomiting and worsening headache. Orders received from Dr. Mera via telephone with readback for zofran 8 mg disintegrating tablet every 6 hours PRN, toradol 15 mg IV q every 8 hours PRN, and to discontinue oral ibuprofen.    0745: Called Dr. Mera to notify her of patient's blood pressure of 154/93. Orders received via telephone with readback for labetalol 200 mg PO BID.

## 2021-04-25 NOTE — PLAN OF CARE
VSS, /82 and 123/81.  Reflexes are normal, no clonus.  Magnesium drip stopped at 2115 per MD order. Pt reports very mild headache this evening.  Independent with cares and ambulation.   is at bedside and supportive.  Plan to repeat labs in AM.  Will continue to monitor and support.

## 2021-04-25 NOTE — PLAN OF CARE
BP 130s-150s/90s this shift. Procardia 30 mg started- given at 0433. Reflexes +2 bilaterally, no clonus. Patient complains of headache that was improving at the beginning of the shift, but worsened as the shift went on. Patient thinks it may be triggered by neck tension- using warm packs and ice to neck. Denies visual disturbances and epigastric pain. Using tylenol and ibuprofen for pain control.

## 2021-04-25 NOTE — PLAN OF CARE
"At the beginning of shift patient was very nauseated and headache was intense.  She stated \"it is as bad as it was last Wednesday when I came in.\" Toradol given with relief.  Patient was started on labetelol and Procardia XR and blood pressures are coming down toward normal.  Patient feeling better but doesn't have an appetite.  Spouse present and very supportive.  Patient does not have any symptoms of magnesium toxicity.    "

## 2021-04-25 NOTE — PROGRESS NOTES
"[facility] Post-Op Progress Note  2021    S: Doing well.  Pain is well controlled from CS. Bleeding Light. Infant is being .  Ambulatory.  Tolerating regular diet. Voiding spontaneously. Passing gas, no BM yet. Had n/v earlier this am from her HA. Ordered toradol and zofran and now HA is 4/10    O:  /83   Pulse 55   Temp 98  F (36.7  C) (Oral)   Resp 16   Ht 1.727 m (5' 8\")   Wt 84.1 kg (185 lb 8 oz)   SpO2 98%   BMI 28.21 kg/m     Vitals:    21 0400 21 0545 21 0740 21 1112   BP: (!) 152/94 (!) 147/92 (!) 154/93 135/83   Pulse: 62 55     Resp: 16 16     Temp: 98  F (36.7  C)      TempSrc: Oral      SpO2:       Weight:       Height:         Gen: healthy, alert and no distress   Resp: Nonlabored breathing  Abd: soft, non-distended, appropriately TTP,   Incision: C/D/I,    Ext: non-tender, no edema    Hemoglobin   Date Value Ref Range Status   2021 11.5 (L) 11.7 - 15.7 g/dL Final   2021 11.3 (L) 11.7 - 15.7 g/dL Final     Lab Results   Component Value Date    ABO O 04/15/2021    RH Pos 04/15/2021    AS Neg 04/15/2021    RUBELLAABIGG 3.95 2018       A: 31 year old  POD#9/HD#3 s/p CS admitted for PP preeclampsia   P:   Routine post-operative care  MgSo4 was DC'ed yesterday and was started on Procarida XL 30mg daily but still had elevated BP's so labetalol 200mg po bid was started-labs are normal and stable  Pt had c/o HA so gave a dose of toradol 15mg and now much improved   Pt declines imaging for now but if BP increases then to have MRI of brain  Possible discontinue tomorrow if remains stable today      Shanika Mera, DO   Obstetrics, Gynecology, and Infertility      "

## 2021-04-25 NOTE — PROVIDER NOTIFICATION
MD Notification    Notified Person: MD    Notified Person Name: Dr. Luz    Notification Date/Time: 04-24-21 @ 7130     Notification Interaction: telephone    Purpose of Notification: update of headache improving, BP stable    Orders Received:  Ok to stop her Magnesium drip now.  Repeat labs in AM, CBC w/ platelets, Magnesium level, creat, ALT, AST.  Call MD is BP >150/100.    Comments:

## 2021-04-25 NOTE — PROVIDER NOTIFICATION
0410: Called Dr. Luz about patient's 0400 BP of 152/94 and worsening headache. Orders received from Dr. Luz via telephone with readback for Nifedipine 30 mg XR tablet every 24 hours and to modify AM labs (creatinine, AST, ALT, CBC w/ platelets) to now (0430). Asked Dr. Luz if she would like to order anything else for patient's headache since ibuprofen and tylenol are not working for her and she would not like to order any additional pain medications at this time.    0450: Dr. Luz called the unit to talk again about additional medications for the patient's headache. Orders received via telephone with readback for Ultram 50 mg PO every 6 hours PRN.

## 2021-04-25 NOTE — PLAN OF CARE
"VSS, /81.  Reflexes are normal, no clonus.  Pt reports very mild headache this evening.  Pt has minimal appetite, but trying to eat \"just feeling yucky\".  Independent with cares and ambulation.   is at bedside and supportive. Will continue to monitor and support.  "

## 2021-04-26 VITALS
RESPIRATION RATE: 16 BRPM | OXYGEN SATURATION: 98 % | DIASTOLIC BLOOD PRESSURE: 68 MMHG | SYSTOLIC BLOOD PRESSURE: 114 MMHG | BODY MASS INDEX: 27.11 KG/M2 | HEIGHT: 68 IN | TEMPERATURE: 98.4 F | HEART RATE: 75 BPM | WEIGHT: 178.9 LBS

## 2021-04-26 PROCEDURE — 250N000011 HC RX IP 250 OP 636: Performed by: OBSTETRICS & GYNECOLOGY

## 2021-04-26 PROCEDURE — A9585 GADOBUTROL INJECTION: HCPCS | Performed by: OBSTETRICS & GYNECOLOGY

## 2021-04-26 PROCEDURE — 250N000013 HC RX MED GY IP 250 OP 250 PS 637: Performed by: OBSTETRICS & GYNECOLOGY

## 2021-04-26 PROCEDURE — 255N000002 HC RX 255 OP 636: Performed by: OBSTETRICS & GYNECOLOGY

## 2021-04-26 RX ORDER — GADOBUTROL 604.72 MG/ML
7.5 INJECTION INTRAVENOUS ONCE
Status: COMPLETED | OUTPATIENT
Start: 2021-04-26 | End: 2021-04-26

## 2021-04-26 RX ORDER — NIFEDIPINE 30 MG/1
60 TABLET, EXTENDED RELEASE ORAL DAILY
Status: DISCONTINUED | OUTPATIENT
Start: 2021-04-26 | End: 2021-04-26 | Stop reason: HOSPADM

## 2021-04-26 RX ADMIN — KETOROLAC TROMETHAMINE 15 MG: 30 INJECTION, SOLUTION INTRAMUSCULAR at 01:14

## 2021-04-26 RX ADMIN — SENNOSIDES AND DOCUSATE SODIUM 1 TABLET: 8.6; 5 TABLET ORAL at 09:42

## 2021-04-26 RX ADMIN — ACETAMINOPHEN 975 MG: 325 TABLET, FILM COATED ORAL at 11:58

## 2021-04-26 RX ADMIN — LORAZEPAM 0.5 MG: 2 INJECTION INTRAMUSCULAR; INTRAVENOUS at 00:01

## 2021-04-26 RX ADMIN — NIFEDIPINE 60 MG: 30 TABLET, FILM COATED, EXTENDED RELEASE ORAL at 06:36

## 2021-04-26 RX ADMIN — ACETAMINOPHEN 975 MG: 325 TABLET, FILM COATED ORAL at 06:23

## 2021-04-26 RX ADMIN — ACETAMINOPHEN 975 MG: 325 TABLET, FILM COATED ORAL at 00:01

## 2021-04-26 RX ADMIN — GADOBUTROL 7.5 ML: 604.72 INJECTION INTRAVENOUS at 01:04

## 2021-04-26 ASSESSMENT — MIFFLIN-ST. JEOR: SCORE: 1564.99

## 2021-04-26 NOTE — PROVIDER NOTIFICATION
Phone call to Dr Luz to verify that the patient's labetalol order should be discontinued. TORB order to discontinue and also try to hold off on toradol for the time being. Update her this afternoon on patient's status to determine if discharge is possible.

## 2021-04-26 NOTE — PROVIDER NOTIFICATION
04/25/21 2244   Provider Notification   Provider Name/Title Samaria   Method of Notification Phone   Request Evaluate-Remote   Notification Reason Medication Request     MD paged for pt nausea and anxiety about MRI. New orders for compazine and one time dose of ativan before MRI.

## 2021-04-26 NOTE — PROVIDER NOTIFICATION
04/25/21 2125   Provider Notification   Provider Name/Title Grajczyk   Method of Notification Phone   Request Evaluate-Remote   Notification Reason Vital Signs Change       MD paged for increased BPs and pt increased headache and dizziness while being bradycardic after labetalol dose. New orders for MRI, atarax, and MRI STAT. IV hydralazine protocol to be followed.

## 2021-04-26 NOTE — PLAN OF CARE
Vital signs stable. Okay to discharge per Dr Luz. Procardia 60 mg prescription filled by pharmacy and sent home with patient. Discharge instructions reviewed with patient and . No further questions or concerns. Discharged home.

## 2021-04-26 NOTE — PLAN OF CARE
BPs have come down overnight, given IV hydralazine last night for high pressures. Procardia changed to 60mg this am. Headache and nausea all evening, pt stated she has not eaten much in the last two days due to not feeling well. Headache gone this morning. Pt no longer nauseated. MRI complete last evening, WNL. No clonus, reflexes normal. No blurred vision other than brief dizziness when up to bathroom 45 minutes after labetalol, HR 56bpm at the time. No epigastric pain. Pt stated being anxious about not feeling better, reassurance provided, feeling better this am.

## 2021-04-26 NOTE — PROGRESS NOTES
"HD #4  S.  When off magnesium off yesterday AM, had HTN and headache again.  Started on labetalol with her procardia yest, had dizziness last night with a lower heart rate.  Got hydralazine last night for systolic . Got IV toradol and vistaril for her headache.  O.  AVSS  /78   Pulse 81   Temp 98.6  F (37  C) (Oral)   Resp 16   Ht 1.727 m (5' 8\")   Wt 81.1 kg (178 lb 14.4 oz)   SpO2 98%   BMI 27.20 kg/m    Abd soft, nontender    Yesterday plts 282k, Cr 0.84, AST and ALT normal  MRI last night normal    A.  HD#4/ POD #10 PP preeclampsia, with hard to manage blood pressures and headaches.  Her HA seem to come with her elevated BPs.  Dizziness on labetalol, associated with low HR.  MRI negative.    P.  Increase nifedipine to 60mg XR  Stop labetalol due to low HR and dizziness  Consider hospitalist consult if continues to have issue with her BP and headaches; not breastfeeding, so could also consider other meds like lisinopril or hydrochlorothiazide /LBakerMD  "

## 2021-04-26 NOTE — PROVIDER NOTIFICATION
Dr Luz updated on patients blood pressures throughout the day as well as the patient feeling well, no headache or any other signs or symptoms of preeclampsia. Order to discharge home with 60mg of procardia and follow up in clinic with Dr Garber on Friday for a blood pressure check.

## 2021-05-12 NOTE — DISCHARGE SUMMARY
[facility] Discharge Summary    Sheila Calloway MRN# [id number]   Age: 31 year old YOB: 1990     Date of Admission:  2021  Date of Discharge::  2021  4:49 PM  Admitting Physician:  Paris Garber MD  Discharge Physician:  Kassy Luz MD     Home clinic: Obstetrics, Gynecology, and Infertility          Admission Diagnoses:   1. Postpartum preeclampsia  2. Headache          Discharge Diagnosis:     1. As above          Procedures:   Magnesium infusion  Serial blood pressure monitoring         Medications Prior to Admission:     No medications prior to admission.             Discharge Medications:     Discharge Medication List as of 2021  4:06 PM      START taking these medications    Details   NIFEdipine ER OSMOTIC (ADALAT CC) 60 MG 24 hr tablet Take 1 tablet (60 mg) by mouth daily, Disp-30 tablet, R-0, E-Prescribe         CONTINUE these medications which have NOT CHANGED    Details   acetaminophen (TYLENOL) 500 MG tablet Take 500-1,000 mg by mouth every 4 hours as needed for mild pain, Historical      famotidine (PEPCID) 10 MG tablet Take 10 mg by mouth daily as needed, Historical         STOP taking these medications       ibuprofen (ADVIL/MOTRIN) 600 MG tablet Comments:   Reason for Stopping:         oxyCODONE (ROXICODONE) 5 MG tablet Comments:   Reason for Stopping:                      Brief History of Labor or Admission:   hSeila Calloway is a 31 year old  who was admitted at 39w1d for postpartum preeclampsia.  She presented with a severe headache.  She did have an MRI of the brain on hospital day 3 due to persistent severe headache but this was normal.            Hospital Course:     She received magnesium for seizure prophylaxis.  Blood pressure was controlled with nifedipine 60 mg XL.     Post-partum hemoglobin:   Hemoglobin   Date Value Ref Range Status   2021 11.5 (L) 11.7 - 15.7 g/dL Final             Discharge Instructions and Follow-Up:      Discharge diet: Regular   Discharge activity: No heavy lifting, pushing, pulling for 6 week(s)  No driving or operating machinery while on narcotic analgesics  Pelvic rest: abstain from intercourse and do not use tampons for 6 week(s)   Discharge follow-up: Incision check in 2 weeks  Routine postpartum visit in 8-10 weeks   Wound care: Keep wound clean and dry  May shower but do not soak incision or scrub  Steri-strips off in 7 days             Discharge Disposition:     Discharged to home      Paris Garber MD   I

## 2021-10-09 ENCOUNTER — HEALTH MAINTENANCE LETTER (OUTPATIENT)
Age: 31
End: 2021-10-09

## 2022-05-21 ENCOUNTER — HEALTH MAINTENANCE LETTER (OUTPATIENT)
Age: 32
End: 2022-05-21

## 2022-09-17 ENCOUNTER — HEALTH MAINTENANCE LETTER (OUTPATIENT)
Age: 32
End: 2022-09-17

## 2023-06-04 ENCOUNTER — HEALTH MAINTENANCE LETTER (OUTPATIENT)
Age: 33
End: 2023-06-04

## 2025-03-27 ENCOUNTER — APPOINTMENT (OUTPATIENT)
Dept: URBAN - METROPOLITAN AREA CLINIC 257 | Age: 35
Setting detail: DERMATOLOGY
End: 2025-03-29

## 2025-03-27 DIAGNOSIS — D49.2 NEOPLASM OF UNSPECIFIED BEHAVIOR OF BONE, SOFT TISSUE, AND SKIN: ICD-10-CM

## 2025-03-27 DIAGNOSIS — Z71.89 OTHER SPECIFIED COUNSELING: ICD-10-CM

## 2025-03-27 DIAGNOSIS — D22 MELANOCYTIC NEVI: ICD-10-CM

## 2025-03-27 DIAGNOSIS — L81.4 OTHER MELANIN HYPERPIGMENTATION: ICD-10-CM

## 2025-03-27 DIAGNOSIS — D18.0 HEMANGIOMA: ICD-10-CM

## 2025-03-27 DIAGNOSIS — L57.8 OTHER SKIN CHANGES DUE TO CHRONIC EXPOSURE TO NONIONIZING RADIATION: ICD-10-CM

## 2025-03-27 DIAGNOSIS — L82.1 OTHER SEBORRHEIC KERATOSIS: ICD-10-CM

## 2025-03-27 PROBLEM — D18.01 HEMANGIOMA OF SKIN AND SUBCUTANEOUS TISSUE: Status: ACTIVE | Noted: 2025-03-27

## 2025-03-27 PROBLEM — D22.5 MELANOCYTIC NEVI OF TRUNK: Status: ACTIVE | Noted: 2025-03-27

## 2025-03-27 PROCEDURE — OTHER MIPS QUALITY: OTHER

## 2025-03-27 PROCEDURE — OTHER OBSERVATION: OTHER

## 2025-03-27 PROCEDURE — OTHER BIOPSY BY SHAVE METHOD: OTHER

## 2025-03-27 PROCEDURE — OTHER COUNSELING: OTHER

## 2025-03-27 ASSESSMENT — LOCATION ZONE DERM
LOCATION ZONE: LEG
LOCATION ZONE: TRUNK

## 2025-03-27 ASSESSMENT — LOCATION DETAILED DESCRIPTION DERM
LOCATION DETAILED: RIGHT SUPERIOR MEDIAL UPPER BACK
LOCATION DETAILED: LEFT SUPERIOR MEDIAL UPPER BACK
LOCATION DETAILED: RIGHT LATERAL BUTTOCK
LOCATION DETAILED: LEFT ANTERIOR PROXIMAL THIGH
LOCATION DETAILED: LEFT LATERAL ABDOMEN
LOCATION DETAILED: LEFT SUPRAPUBIC SKIN
LOCATION DETAILED: LEFT MEDIAL UPPER BACK
LOCATION DETAILED: LEFT SUPERIOR UPPER BACK

## 2025-03-27 ASSESSMENT — LOCATION SIMPLE DESCRIPTION DERM
LOCATION SIMPLE: LEFT THIGH
LOCATION SIMPLE: RIGHT UPPER BACK
LOCATION SIMPLE: GROIN
LOCATION SIMPLE: RIGHT BUTTOCK
LOCATION SIMPLE: LEFT UPPER BACK
LOCATION SIMPLE: ABDOMEN

## 2025-03-27 NOTE — HPI: FULL BODY SKIN EXAMINATION
How Severe Are Your Spot(S)?: mild
What Type Of Note Output Would You Prefer (Optional)?: Standard Output
What Is The Reason For Today's Visit?: Full Body Skin Examination
What Is The Reason For Today's Visit? (Being Monitored For X): concerning skin lesions on an annual basis
Additional History: Pt reports she has two specific lesions of concern located on her right buttock and left groin. She states the lesion on her right buttock looks irregular and different colored. She explains the lesion on her groin is darker and looks different than her other lesions. She is seeking further evaluation of her current skin lesions at this time. \\n

## 2025-03-27 NOTE — PROCEDURE: BIOPSY BY SHAVE METHOD
Detail Level: Detailed
Depth Of Biopsy: dermis
Was A Bandage Applied: Yes
Size Of Lesion In Cm: 1
X Size Of Lesion In Cm: 0
Biopsy Type: H and E
Biopsy Method: Dermablade
Anesthesia Type: 1% lidocaine with epinephrine and a 1:10 solution of 8.4% sodium bicarbonate
Anesthesia Volume In Cc: 0.3
Hemostasis: Aluminum Chloride
Wound Care: Petrolatum
Dressing: bandage
Destruction After The Procedure: No
Type Of Destruction Used: Curettage
Curettage Text: The wound bed was treated with curettage after the biopsy was performed.
Cryotherapy Text: The wound bed was treated with cryotherapy after the biopsy was performed.
Electrodesiccation Text: The wound bed was treated with electrodesiccation after the biopsy was performed.
Electrodesiccation And Curettage Text: The wound bed was treated with electrodesiccation and curettage after the biopsy was performed.
Silver Nitrate Text: The wound bed was treated with silver nitrate after the biopsy was performed.
Lab: [id number]
Medical Necessity Information: It is in your best interest to select a reason for this procedure from the list below. All of these items fulfill various CMS LCD requirements except the new and changing color options.
Consent: Written consent was obtained and risks were reviewed including but not limited to scarring, infection, bleeding, scabbing, incomplete removal, nerve damage and allergy to anesthesia.
Post-Care Instructions: I reviewed with the patient in detail post-care instructions. Patient is to keep the biopsy site dry overnight, and then apply bacitracin twice daily until healed. Patient may apply hydrogen peroxide soaks to remove any crusting.
Notification Instructions: Patient will be notified of biopsy results. However, patient instructed to call the office if not contacted within 2 weeks.
Billing Type: Third-Party Bill
Information: Selecting Yes will display possible errors in your note based on the variables you have selected. This validation is only offered as a suggestion for you. PLEASE NOTE THAT THE VALIDATION TEXT WILL BE REMOVED WHEN YOU FINALIZE YOUR NOTE. IF YOU WANT TO FAX A PRELIMINARY NOTE YOU WILL NEED TO TOGGLE THIS TO 'NO' IF YOU DO NOT WANT IT IN YOUR FAXED NOTE.
Size Of Lesion In Cm: 0.6
Size Of Lesion In Cm: 1.2
Anesthesia Volume In Cc: 0.5

## 2025-06-19 ENCOUNTER — APPOINTMENT (OUTPATIENT)
Dept: URBAN - METROPOLITAN AREA CLINIC 257 | Age: 35
Setting detail: DERMATOLOGY
End: 2025-06-19

## 2025-06-19 DIAGNOSIS — Z85.820 PERSONAL HISTORY OF MALIGNANT MELANOMA OF SKIN: ICD-10-CM

## 2025-06-19 DIAGNOSIS — L82.1 OTHER SEBORRHEIC KERATOSIS: ICD-10-CM

## 2025-06-19 DIAGNOSIS — D22 MELANOCYTIC NEVI: ICD-10-CM

## 2025-06-19 DIAGNOSIS — D18.0 HEMANGIOMA: ICD-10-CM

## 2025-06-19 DIAGNOSIS — Z71.89 OTHER SPECIFIED COUNSELING: ICD-10-CM

## 2025-06-19 DIAGNOSIS — L81.4 OTHER MELANIN HYPERPIGMENTATION: ICD-10-CM

## 2025-06-19 DIAGNOSIS — L57.8 OTHER SKIN CHANGES DUE TO CHRONIC EXPOSURE TO NONIONIZING RADIATION: ICD-10-CM

## 2025-06-19 DIAGNOSIS — Z87.2 PERSONAL HISTORY OF DISEASES OF THE SKIN AND SUBCUTANEOUS TISSUE: ICD-10-CM

## 2025-06-19 DIAGNOSIS — D49.2 NEOPLASM OF UNSPECIFIED BEHAVIOR OF BONE, SOFT TISSUE, AND SKIN: ICD-10-CM

## 2025-06-19 PROBLEM — D18.01 HEMANGIOMA OF SKIN AND SUBCUTANEOUS TISSUE: Status: ACTIVE | Noted: 2025-06-19

## 2025-06-19 PROBLEM — D22.71 MELANOCYTIC NEVI OF RIGHT LOWER LIMB, INCLUDING HIP: Status: ACTIVE | Noted: 2025-06-19

## 2025-06-19 PROBLEM — D22.5 MELANOCYTIC NEVI OF TRUNK: Status: ACTIVE | Noted: 2025-06-19

## 2025-06-19 PROCEDURE — OTHER COUNSELING: OTHER

## 2025-06-19 PROCEDURE — OTHER MIPS QUALITY: OTHER

## 2025-06-19 PROCEDURE — 99213 OFFICE O/P EST LOW 20 MIN: CPT | Mod: 25

## 2025-06-19 PROCEDURE — OTHER PHOTO-DOCUMENTATION: OTHER

## 2025-06-19 PROCEDURE — OTHER MONITORING: OTHER

## 2025-06-19 PROCEDURE — 11102 TANGNTL BX SKIN SINGLE LES: CPT

## 2025-06-19 PROCEDURE — OTHER BIOPSY BY SHAVE METHOD: OTHER

## 2025-06-19 ASSESSMENT — LOCATION SIMPLE DESCRIPTION DERM
LOCATION SIMPLE: RIGHT BUTTOCK
LOCATION SIMPLE: ABDOMEN
LOCATION SIMPLE: RIGHT GREAT TOE
LOCATION SIMPLE: LEFT UPPER BACK
LOCATION SIMPLE: GROIN
LOCATION SIMPLE: LEFT THIGH
LOCATION SIMPLE: RIGHT UPPER BACK

## 2025-06-19 ASSESSMENT — LOCATION DETAILED DESCRIPTION DERM
LOCATION DETAILED: LEFT MEDIAL UPPER BACK
LOCATION DETAILED: RIGHT DORSAL GREAT TOE
LOCATION DETAILED: MONS PUBIS
LOCATION DETAILED: LEFT SUPERIOR MEDIAL UPPER BACK
LOCATION DETAILED: RIGHT MID-UPPER BACK
LOCATION DETAILED: LEFT LATERAL ABDOMEN
LOCATION DETAILED: LEFT ANTERIOR PROXIMAL THIGH
LOCATION DETAILED: RIGHT LATERAL BUTTOCK

## 2025-06-19 ASSESSMENT — LOCATION ZONE DERM
LOCATION ZONE: VULVA
LOCATION ZONE: TOE
LOCATION ZONE: TRUNK
LOCATION ZONE: LEG

## (undated) DEVICE — ESU GROUND PAD UNIVERSAL W/O CORD

## (undated) DEVICE — SUCTION CANISTER MEDIVAC LINER 3000ML W/LID 65651-530

## (undated) DEVICE — LINEN C-SECTION 5415

## (undated) DEVICE — GLOVE PROTEXIS W/NEU-THERA 7.0  2D73TE70

## (undated) DEVICE — PACK TVT HYSTEROSCOPY SMA15HYFSE

## (undated) DEVICE — GLOVE ORTHO 7 LATEX

## (undated) DEVICE — LINEN TOWEL PACK X5 5464

## (undated) DEVICE — SOL WATER IRRIG 1000ML BOTTLE 2F7114

## (undated) DEVICE — TAPE MEDIPORE 4"X10YD 2964

## (undated) DEVICE — SUCTION CANNULA UTERINE 07MM CVD  21853

## (undated) DEVICE — SYR 10ML FINGER CONTROL W/O NDL 309695

## (undated) DEVICE — SOL NACL 0.9% IRRIG 1000ML BOTTLE 07138-09

## (undated) DEVICE — SOL NACL 0.9% IRRIG 1000ML BOTTLE 2F7124

## (undated) DEVICE — DRSG TELFA 3X8" 1238

## (undated) DEVICE — GLOVE PROTEXIS BLUE W/NEU-THERA 6.5  2D73EB65

## (undated) DEVICE — BLADE CLIPPER 4406

## (undated) DEVICE — CATH INTERMITTENT CLEAN-CATH FEMALE 14FR 6" VINYL LF 420614

## (undated) DEVICE — DRSG STERI STRIP 1/4X3" R1541

## (undated) DEVICE — CATH TRAY FOLEY 16FR BARDEX W/DRAIN BAG STATLOCK 300316A

## (undated) DEVICE — SU VICRYL 0 CT 36" J358H

## (undated) DEVICE — DRSG GAUZE 4X4" TOPPER

## (undated) DEVICE — SU VICRYL 3-0 SH 27" J316H

## (undated) DEVICE — SU MONOCRYL 0 CT-1 36" UND Y946H

## (undated) DEVICE — PACK C-SECTION LF PL15OTA83B

## (undated) DEVICE — NDL SPINAL 22GA 3.5" QUINCKE 405181

## (undated) DEVICE — TUBING SUCTION VACUUM COLLECTION 6FT 610

## (undated) DEVICE — TUBING VACUUM COLLECTION 6FT 23116

## (undated) DEVICE — PREP CHLORAPREP 26ML TINTED ORANGE  260815

## (undated) RX ORDER — ONDANSETRON 2 MG/ML
INJECTION INTRAMUSCULAR; INTRAVENOUS
Status: DISPENSED
Start: 2021-04-16

## (undated) RX ORDER — ACETAMINOPHEN 325 MG/1
TABLET ORAL
Status: DISPENSED
Start: 2020-05-19

## (undated) RX ORDER — DOXYCYCLINE 100 MG/10ML
INJECTION, POWDER, LYOPHILIZED, FOR SOLUTION INTRAVENOUS
Status: DISPENSED
Start: 2020-05-19

## (undated) RX ORDER — PROPOFOL 10 MG/ML
INJECTION, EMULSION INTRAVENOUS
Status: DISPENSED
Start: 2017-07-14

## (undated) RX ORDER — ONDANSETRON 2 MG/ML
INJECTION INTRAMUSCULAR; INTRAVENOUS
Status: DISPENSED
Start: 2017-07-14

## (undated) RX ORDER — MORPHINE SULFATE 1 MG/ML
INJECTION, SOLUTION EPIDURAL; INTRATHECAL; INTRAVENOUS
Status: DISPENSED
Start: 2021-04-16

## (undated) RX ORDER — KETOROLAC TROMETHAMINE 30 MG/ML
INJECTION, SOLUTION INTRAMUSCULAR; INTRAVENOUS
Status: DISPENSED
Start: 2017-07-14

## (undated) RX ORDER — KETOROLAC TROMETHAMINE 30 MG/ML
INJECTION, SOLUTION INTRAMUSCULAR; INTRAVENOUS
Status: DISPENSED
Start: 2021-04-16

## (undated) RX ORDER — LIDOCAINE HYDROCHLORIDE 20 MG/ML
INJECTION, SOLUTION EPIDURAL; INFILTRATION; INTRACAUDAL; PERINEURAL
Status: DISPENSED
Start: 2017-07-14

## (undated) RX ORDER — FENTANYL CITRATE 50 UG/ML
INJECTION, SOLUTION INTRAMUSCULAR; INTRAVENOUS
Status: DISPENSED
Start: 2021-04-16

## (undated) RX ORDER — FENTANYL CITRATE 50 UG/ML
INJECTION, SOLUTION INTRAMUSCULAR; INTRAVENOUS
Status: DISPENSED
Start: 2017-07-14

## (undated) RX ORDER — PROPOFOL 10 MG/ML
INJECTION, EMULSION INTRAVENOUS
Status: DISPENSED
Start: 2020-05-19

## (undated) RX ORDER — KETOROLAC TROMETHAMINE 30 MG/ML
INJECTION, SOLUTION INTRAMUSCULAR; INTRAVENOUS
Status: DISPENSED
Start: 2020-05-19

## (undated) RX ORDER — OXYTOCIN/0.9 % SODIUM CHLORIDE 30/500 ML
PLASTIC BAG, INJECTION (ML) INTRAVENOUS
Status: DISPENSED
Start: 2021-04-16

## (undated) RX ORDER — DEXAMETHASONE SODIUM PHOSPHATE 4 MG/ML
INJECTION, SOLUTION INTRA-ARTICULAR; INTRALESIONAL; INTRAMUSCULAR; INTRAVENOUS; SOFT TISSUE
Status: DISPENSED
Start: 2017-07-14

## (undated) RX ORDER — FENTANYL CITRATE 50 UG/ML
INJECTION, SOLUTION INTRAMUSCULAR; INTRAVENOUS
Status: DISPENSED
Start: 2020-05-19